# Patient Record
Sex: MALE | Race: WHITE | NOT HISPANIC OR LATINO | URBAN - METROPOLITAN AREA
[De-identification: names, ages, dates, MRNs, and addresses within clinical notes are randomized per-mention and may not be internally consistent; named-entity substitution may affect disease eponyms.]

---

## 2022-01-01 ENCOUNTER — INPATIENT (INPATIENT)
Facility: HOSPITAL | Age: 82
LOS: 9 days | End: 2022-12-25
Attending: INTERNAL MEDICINE | Admitting: INTERNAL MEDICINE
Payer: MEDICARE

## 2022-01-01 VITALS
HEART RATE: 130 BPM | OXYGEN SATURATION: 93 % | DIASTOLIC BLOOD PRESSURE: 60 MMHG | RESPIRATION RATE: 38 BRPM | SYSTOLIC BLOOD PRESSURE: 132 MMHG

## 2022-01-01 VITALS
TEMPERATURE: 97 F | SYSTOLIC BLOOD PRESSURE: 96 MMHG | DIASTOLIC BLOOD PRESSURE: 66 MMHG | HEART RATE: 88 BPM | OXYGEN SATURATION: 94 % | RESPIRATION RATE: 20 BRPM

## 2022-01-01 DIAGNOSIS — F41.9 ANXIETY DISORDER, UNSPECIFIED: ICD-10-CM

## 2022-01-01 DIAGNOSIS — R06.00 DYSPNEA, UNSPECIFIED: ICD-10-CM

## 2022-01-01 DIAGNOSIS — C34.90 MALIGNANT NEOPLASM OF UNSPECIFIED PART OF UNSPECIFIED BRONCHUS OR LUNG: ICD-10-CM

## 2022-01-01 DIAGNOSIS — Z51.5 ENCOUNTER FOR PALLIATIVE CARE: ICD-10-CM

## 2022-01-01 DIAGNOSIS — J96.01 ACUTE RESPIRATORY FAILURE WITH HYPOXIA: ICD-10-CM

## 2022-01-01 LAB
A1C WITH ESTIMATED AVERAGE GLUCOSE RESULT: 5.3 % — SIGNIFICANT CHANGE UP (ref 4–5.6)
ALBUMIN SERPL ELPH-MCNC: 2.4 G/DL — LOW (ref 3.5–5.2)
ALBUMIN SERPL ELPH-MCNC: 2.5 G/DL — LOW (ref 3.5–5.2)
ALBUMIN SERPL ELPH-MCNC: 2.7 G/DL — LOW (ref 3.5–5.2)
ALBUMIN SERPL ELPH-MCNC: 2.8 G/DL — LOW (ref 3.5–5.2)
ALP SERPL-CCNC: 101 U/L — SIGNIFICANT CHANGE UP (ref 30–115)
ALP SERPL-CCNC: 101 U/L — SIGNIFICANT CHANGE UP (ref 30–115)
ALP SERPL-CCNC: 102 U/L — SIGNIFICANT CHANGE UP (ref 30–115)
ALP SERPL-CCNC: 103 U/L — SIGNIFICANT CHANGE UP (ref 30–115)
ALP SERPL-CCNC: 129 U/L — HIGH (ref 30–115)
ALP SERPL-CCNC: 94 U/L — SIGNIFICANT CHANGE UP (ref 30–115)
ALT FLD-CCNC: 10 U/L — SIGNIFICANT CHANGE UP (ref 0–41)
ALT FLD-CCNC: 10 U/L — SIGNIFICANT CHANGE UP (ref 0–41)
ALT FLD-CCNC: 11 U/L — SIGNIFICANT CHANGE UP (ref 0–41)
ALT FLD-CCNC: 12 U/L — SIGNIFICANT CHANGE UP (ref 0–41)
ALT FLD-CCNC: 6 U/L — SIGNIFICANT CHANGE UP (ref 0–41)
ALT FLD-CCNC: 8 U/L — SIGNIFICANT CHANGE UP (ref 0–41)
ANION GAP SERPL CALC-SCNC: 12 MMOL/L — SIGNIFICANT CHANGE UP (ref 7–14)
ANION GAP SERPL CALC-SCNC: 12 MMOL/L — SIGNIFICANT CHANGE UP (ref 7–14)
ANION GAP SERPL CALC-SCNC: 13 MMOL/L — SIGNIFICANT CHANGE UP (ref 7–14)
ANION GAP SERPL CALC-SCNC: 14 MMOL/L — SIGNIFICANT CHANGE UP (ref 7–14)
ANION GAP SERPL CALC-SCNC: 14 MMOL/L — SIGNIFICANT CHANGE UP (ref 7–14)
ANION GAP SERPL CALC-SCNC: 9 MMOL/L — SIGNIFICANT CHANGE UP (ref 7–14)
ANION GAP SERPL CALC-SCNC: 9 MMOL/L — SIGNIFICANT CHANGE UP (ref 7–14)
AST SERPL-CCNC: 11 U/L — SIGNIFICANT CHANGE UP (ref 0–41)
AST SERPL-CCNC: 11 U/L — SIGNIFICANT CHANGE UP (ref 0–41)
AST SERPL-CCNC: 12 U/L — SIGNIFICANT CHANGE UP (ref 0–41)
AST SERPL-CCNC: 20 U/L — SIGNIFICANT CHANGE UP (ref 0–41)
AST SERPL-CCNC: 7 U/L — SIGNIFICANT CHANGE UP (ref 0–41)
AST SERPL-CCNC: 7 U/L — SIGNIFICANT CHANGE UP (ref 0–41)
BASE EXCESS BLDV CALC-SCNC: -1.1 MMOL/L — SIGNIFICANT CHANGE UP (ref -2–3)
BASOPHILS # BLD AUTO: 0.01 K/UL — SIGNIFICANT CHANGE UP (ref 0–0.2)
BASOPHILS # BLD AUTO: 0.02 K/UL — SIGNIFICANT CHANGE UP (ref 0–0.2)
BASOPHILS NFR BLD AUTO: 0.1 % — SIGNIFICANT CHANGE UP (ref 0–1)
BILIRUB SERPL-MCNC: 0.2 MG/DL — SIGNIFICANT CHANGE UP (ref 0.2–1.2)
BILIRUB SERPL-MCNC: 0.3 MG/DL — SIGNIFICANT CHANGE UP (ref 0.2–1.2)
BILIRUB SERPL-MCNC: 0.3 MG/DL — SIGNIFICANT CHANGE UP (ref 0.2–1.2)
BILIRUB SERPL-MCNC: 0.4 MG/DL — SIGNIFICANT CHANGE UP (ref 0.2–1.2)
BILIRUB SERPL-MCNC: 0.5 MG/DL — SIGNIFICANT CHANGE UP (ref 0.2–1.2)
BILIRUB SERPL-MCNC: 0.6 MG/DL — SIGNIFICANT CHANGE UP (ref 0.2–1.2)
BLD GP AB SCN SERPL QL: SIGNIFICANT CHANGE UP
BUN SERPL-MCNC: 21 MG/DL — HIGH (ref 10–20)
BUN SERPL-MCNC: 22 MG/DL — HIGH (ref 10–20)
BUN SERPL-MCNC: 23 MG/DL — HIGH (ref 10–20)
BUN SERPL-MCNC: 23 MG/DL — HIGH (ref 10–20)
BUN SERPL-MCNC: 24 MG/DL — HIGH (ref 10–20)
BUN SERPL-MCNC: 25 MG/DL — HIGH (ref 10–20)
BUN SERPL-MCNC: 27 MG/DL — HIGH (ref 10–20)
BUN SERPL-MCNC: 32 MG/DL — HIGH (ref 10–20)
BUN SERPL-MCNC: 38 MG/DL — HIGH (ref 10–20)
CA-I SERPL-SCNC: 1.27 MMOL/L — SIGNIFICANT CHANGE UP (ref 1.15–1.33)
CALCIUM SERPL-MCNC: 8.4 MG/DL — SIGNIFICANT CHANGE UP (ref 8.4–10.5)
CALCIUM SERPL-MCNC: 8.5 MG/DL — SIGNIFICANT CHANGE UP (ref 8.4–10.5)
CALCIUM SERPL-MCNC: 8.6 MG/DL — SIGNIFICANT CHANGE UP (ref 8.4–10.4)
CALCIUM SERPL-MCNC: 8.7 MG/DL — SIGNIFICANT CHANGE UP (ref 8.4–10.5)
CALCIUM SERPL-MCNC: 8.8 MG/DL — SIGNIFICANT CHANGE UP (ref 8.4–10.4)
CALCIUM SERPL-MCNC: 8.8 MG/DL — SIGNIFICANT CHANGE UP (ref 8.4–10.5)
CALCIUM SERPL-MCNC: 9 MG/DL — SIGNIFICANT CHANGE UP (ref 8.4–10.4)
CHLORIDE SERPL-SCNC: 100 MMOL/L — SIGNIFICANT CHANGE UP (ref 98–110)
CHLORIDE SERPL-SCNC: 100 MMOL/L — SIGNIFICANT CHANGE UP (ref 98–110)
CHLORIDE SERPL-SCNC: 101 MMOL/L — SIGNIFICANT CHANGE UP (ref 98–110)
CHLORIDE SERPL-SCNC: 95 MMOL/L — LOW (ref 98–110)
CHLORIDE SERPL-SCNC: 96 MMOL/L — LOW (ref 98–110)
CHLORIDE SERPL-SCNC: 97 MMOL/L — LOW (ref 98–110)
CHLORIDE SERPL-SCNC: 98 MMOL/L — SIGNIFICANT CHANGE UP (ref 98–110)
CHOLEST SERPL-MCNC: 86 MG/DL — SIGNIFICANT CHANGE UP
CO2 SERPL-SCNC: 24 MMOL/L — SIGNIFICANT CHANGE UP (ref 17–32)
CO2 SERPL-SCNC: 25 MMOL/L — SIGNIFICANT CHANGE UP (ref 17–32)
CO2 SERPL-SCNC: 27 MMOL/L — SIGNIFICANT CHANGE UP (ref 17–32)
CO2 SERPL-SCNC: 27 MMOL/L — SIGNIFICANT CHANGE UP (ref 17–32)
CO2 SERPL-SCNC: 28 MMOL/L — SIGNIFICANT CHANGE UP (ref 17–32)
CO2 SERPL-SCNC: 28 MMOL/L — SIGNIFICANT CHANGE UP (ref 17–32)
CO2 SERPL-SCNC: 29 MMOL/L — SIGNIFICANT CHANGE UP (ref 17–32)
CO2 SERPL-SCNC: 31 MMOL/L — SIGNIFICANT CHANGE UP (ref 17–32)
CO2 SERPL-SCNC: 33 MMOL/L — HIGH (ref 17–32)
CREAT SERPL-MCNC: 0.6 MG/DL — LOW (ref 0.7–1.5)
CREAT SERPL-MCNC: 0.7 MG/DL — SIGNIFICANT CHANGE UP (ref 0.7–1.5)
CREAT SERPL-MCNC: 0.8 MG/DL — SIGNIFICANT CHANGE UP (ref 0.7–1.5)
CREAT SERPL-MCNC: 0.8 MG/DL — SIGNIFICANT CHANGE UP (ref 0.7–1.5)
CULTURE RESULTS: SIGNIFICANT CHANGE UP
D DIMER BLD IA.RAPID-MCNC: 397 NG/ML DDU — HIGH
EGFR: 88 ML/MIN/1.73M2 — SIGNIFICANT CHANGE UP
EGFR: 88 ML/MIN/1.73M2 — SIGNIFICANT CHANGE UP
EGFR: 91 ML/MIN/1.73M2 — SIGNIFICANT CHANGE UP
EGFR: 96 ML/MIN/1.73M2 — SIGNIFICANT CHANGE UP
EOSINOPHIL # BLD AUTO: 0 K/UL — SIGNIFICANT CHANGE UP (ref 0–0.7)
EOSINOPHIL # BLD AUTO: 0.02 K/UL — SIGNIFICANT CHANGE UP (ref 0–0.7)
EOSINOPHIL # BLD AUTO: 0.03 K/UL — SIGNIFICANT CHANGE UP (ref 0–0.7)
EOSINOPHIL NFR BLD AUTO: 0 % — SIGNIFICANT CHANGE UP (ref 0–8)
EOSINOPHIL NFR BLD AUTO: 0.1 % — SIGNIFICANT CHANGE UP (ref 0–8)
EOSINOPHIL NFR BLD AUTO: 0.1 % — SIGNIFICANT CHANGE UP (ref 0–8)
ESTIMATED AVERAGE GLUCOSE: 105 MG/DL — SIGNIFICANT CHANGE UP (ref 68–114)
FLUAV AG NPH QL: SIGNIFICANT CHANGE UP
FLUBV AG NPH QL: SIGNIFICANT CHANGE UP
GAS PNL BLDV: 132 MMOL/L — LOW (ref 136–145)
GAS PNL BLDV: SIGNIFICANT CHANGE UP
GLUCOSE SERPL-MCNC: 106 MG/DL — HIGH (ref 70–99)
GLUCOSE SERPL-MCNC: 108 MG/DL — HIGH (ref 70–99)
GLUCOSE SERPL-MCNC: 110 MG/DL — HIGH (ref 70–99)
GLUCOSE SERPL-MCNC: 118 MG/DL — HIGH (ref 70–99)
GLUCOSE SERPL-MCNC: 124 MG/DL — HIGH (ref 70–99)
GLUCOSE SERPL-MCNC: 124 MG/DL — HIGH (ref 70–99)
GLUCOSE SERPL-MCNC: 127 MG/DL — HIGH (ref 70–99)
GLUCOSE SERPL-MCNC: 134 MG/DL — HIGH (ref 70–99)
GLUCOSE SERPL-MCNC: 143 MG/DL — HIGH (ref 70–99)
HCO3 BLDV-SCNC: 25 MMOL/L — SIGNIFICANT CHANGE UP (ref 22–29)
HCT VFR BLD CALC: 23.3 % — LOW (ref 42–52)
HCT VFR BLD CALC: 25 % — LOW (ref 42–52)
HCT VFR BLD CALC: 26.8 % — LOW (ref 42–52)
HCT VFR BLD CALC: 28.7 % — LOW (ref 42–52)
HCT VFR BLD CALC: 29.1 % — LOW (ref 42–52)
HCT VFR BLD CALC: 29.1 % — LOW (ref 42–52)
HCT VFR BLD CALC: 30.4 % — LOW (ref 42–52)
HCT VFR BLDA CALC: 28 % — LOW (ref 39–51)
HDLC SERPL-MCNC: 41 MG/DL — SIGNIFICANT CHANGE UP
HGB BLD CALC-MCNC: 9.2 G/DL — LOW (ref 12.6–17.4)
HGB BLD-MCNC: 7.4 G/DL — LOW (ref 14–18)
HGB BLD-MCNC: 7.5 G/DL — LOW (ref 14–18)
HGB BLD-MCNC: 8.3 G/DL — LOW (ref 14–18)
HGB BLD-MCNC: 8.8 G/DL — LOW (ref 14–18)
HGB BLD-MCNC: 9.1 G/DL — LOW (ref 14–18)
HGB BLD-MCNC: 9.5 G/DL — LOW (ref 14–18)
HGB BLD-MCNC: 9.6 G/DL — LOW (ref 14–18)
IMM GRANULOCYTES NFR BLD AUTO: 0.5 % — HIGH (ref 0.1–0.3)
IMM GRANULOCYTES NFR BLD AUTO: 0.6 % — HIGH (ref 0.1–0.3)
IMM GRANULOCYTES NFR BLD AUTO: 0.6 % — HIGH (ref 0.1–0.3)
IMM GRANULOCYTES NFR BLD AUTO: 0.8 % — HIGH (ref 0.1–0.3)
IMM GRANULOCYTES NFR BLD AUTO: 0.9 % — HIGH (ref 0.1–0.3)
IMM GRANULOCYTES NFR BLD AUTO: 1 % — HIGH (ref 0.1–0.3)
LACTATE BLDV-MCNC: 4.9 MMOL/L — CRITICAL HIGH (ref 0.5–2)
LACTATE SERPL-SCNC: 1.3 MMOL/L — SIGNIFICANT CHANGE UP (ref 0.7–2)
LACTATE SERPL-SCNC: 1.8 MMOL/L — SIGNIFICANT CHANGE UP (ref 0.7–2)
LIPID PNL WITH DIRECT LDL SERPL: 36 MG/DL — SIGNIFICANT CHANGE UP
LYMPHOCYTES # BLD AUTO: 0.6 K/UL — LOW (ref 1.2–3.4)
LYMPHOCYTES # BLD AUTO: 0.65 K/UL — LOW (ref 1.2–3.4)
LYMPHOCYTES # BLD AUTO: 0.67 K/UL — LOW (ref 1.2–3.4)
LYMPHOCYTES # BLD AUTO: 0.95 K/UL — LOW (ref 1.2–3.4)
LYMPHOCYTES # BLD AUTO: 1.07 K/UL — LOW (ref 1.2–3.4)
LYMPHOCYTES # BLD AUTO: 1.49 K/UL — SIGNIFICANT CHANGE UP (ref 1.2–3.4)
LYMPHOCYTES # BLD AUTO: 3.7 % — LOW (ref 20.5–51.1)
LYMPHOCYTES # BLD AUTO: 4.9 % — LOW (ref 20.5–51.1)
LYMPHOCYTES # BLD AUTO: 5.2 % — LOW (ref 20.5–51.1)
LYMPHOCYTES # BLD AUTO: 5.3 % — LOW (ref 20.5–51.1)
LYMPHOCYTES # BLD AUTO: 5.9 % — LOW (ref 20.5–51.1)
LYMPHOCYTES # BLD AUTO: 6.3 % — LOW (ref 20.5–51.1)
MAGNESIUM SERPL-MCNC: 1.6 MG/DL — LOW (ref 1.8–2.4)
MAGNESIUM SERPL-MCNC: 1.7 MG/DL — LOW (ref 1.8–2.4)
MAGNESIUM SERPL-MCNC: 2 MG/DL — SIGNIFICANT CHANGE UP (ref 1.8–2.4)
MAGNESIUM SERPL-MCNC: 2.1 MG/DL — SIGNIFICANT CHANGE UP (ref 1.8–2.4)
MAGNESIUM SERPL-MCNC: 2.1 MG/DL — SIGNIFICANT CHANGE UP (ref 1.8–2.4)
MAGNESIUM SERPL-MCNC: 2.2 MG/DL — SIGNIFICANT CHANGE UP (ref 1.8–2.4)
MCHC RBC-ENTMCNC: 24.2 PG — LOW (ref 27–31)
MCHC RBC-ENTMCNC: 24.5 PG — LOW (ref 27–31)
MCHC RBC-ENTMCNC: 24.7 PG — LOW (ref 27–31)
MCHC RBC-ENTMCNC: 24.9 PG — LOW (ref 27–31)
MCHC RBC-ENTMCNC: 25.1 PG — LOW (ref 27–31)
MCHC RBC-ENTMCNC: 25.1 PG — LOW (ref 27–31)
MCHC RBC-ENTMCNC: 25.3 PG — LOW (ref 27–31)
MCHC RBC-ENTMCNC: 30 G/DL — LOW (ref 32–37)
MCHC RBC-ENTMCNC: 30.7 G/DL — LOW (ref 32–37)
MCHC RBC-ENTMCNC: 31 G/DL — LOW (ref 32–37)
MCHC RBC-ENTMCNC: 31.3 G/DL — LOW (ref 32–37)
MCHC RBC-ENTMCNC: 31.6 G/DL — LOW (ref 32–37)
MCHC RBC-ENTMCNC: 31.8 G/DL — LOW (ref 32–37)
MCHC RBC-ENTMCNC: 32.6 G/DL — SIGNIFICANT CHANGE UP (ref 32–37)
MCV RBC AUTO: 77.6 FL — LOW (ref 80–94)
MCV RBC AUTO: 78.4 FL — LOW (ref 80–94)
MCV RBC AUTO: 79 FL — LOW (ref 80–94)
MCV RBC AUTO: 79 FL — LOW (ref 80–94)
MCV RBC AUTO: 79.8 FL — LOW (ref 80–94)
MCV RBC AUTO: 80.6 FL — SIGNIFICANT CHANGE UP (ref 80–94)
MCV RBC AUTO: 81.8 FL — SIGNIFICANT CHANGE UP (ref 80–94)
MONOCYTES # BLD AUTO: 0.23 K/UL — SIGNIFICANT CHANGE UP (ref 0.1–0.6)
MONOCYTES # BLD AUTO: 0.29 K/UL — SIGNIFICANT CHANGE UP (ref 0.1–0.6)
MONOCYTES # BLD AUTO: 0.5 K/UL — SIGNIFICANT CHANGE UP (ref 0.1–0.6)
MONOCYTES # BLD AUTO: 0.76 K/UL — HIGH (ref 0.1–0.6)
MONOCYTES # BLD AUTO: 0.83 K/UL — HIGH (ref 0.1–0.6)
MONOCYTES # BLD AUTO: 0.94 K/UL — HIGH (ref 0.1–0.6)
MONOCYTES NFR BLD AUTO: 1.4 % — LOW (ref 1.7–9.3)
MONOCYTES NFR BLD AUTO: 2.6 % — SIGNIFICANT CHANGE UP (ref 1.7–9.3)
MONOCYTES NFR BLD AUTO: 3.6 % — SIGNIFICANT CHANGE UP (ref 1.7–9.3)
MONOCYTES NFR BLD AUTO: 3.7 % — SIGNIFICANT CHANGE UP (ref 1.7–9.3)
MONOCYTES NFR BLD AUTO: 3.9 % — SIGNIFICANT CHANGE UP (ref 1.7–9.3)
MONOCYTES NFR BLD AUTO: 4.6 % — SIGNIFICANT CHANGE UP (ref 1.7–9.3)
MRSA PCR RESULT.: NEGATIVE — SIGNIFICANT CHANGE UP
NEUTROPHILS # BLD AUTO: 12.48 K/UL — HIGH (ref 1.4–6.5)
NEUTROPHILS # BLD AUTO: 15.1 K/UL — HIGH (ref 1.4–6.5)
NEUTROPHILS # BLD AUTO: 16.23 K/UL — HIGH (ref 1.4–6.5)
NEUTROPHILS # BLD AUTO: 18.23 K/UL — HIGH (ref 1.4–6.5)
NEUTROPHILS # BLD AUTO: 21.12 K/UL — HIGH (ref 1.4–6.5)
NEUTROPHILS # BLD AUTO: 9.95 K/UL — HIGH (ref 1.4–6.5)
NEUTROPHILS NFR BLD AUTO: 88.7 % — HIGH (ref 42.2–75.2)
NEUTROPHILS NFR BLD AUTO: 89.3 % — HIGH (ref 42.2–75.2)
NEUTROPHILS NFR BLD AUTO: 89.8 % — HIGH (ref 42.2–75.2)
NEUTROPHILS NFR BLD AUTO: 90.8 % — HIGH (ref 42.2–75.2)
NEUTROPHILS NFR BLD AUTO: 90.9 % — HIGH (ref 42.2–75.2)
NEUTROPHILS NFR BLD AUTO: 94.2 % — HIGH (ref 42.2–75.2)
NON HDL CHOLESTEROL: 45 MG/DL — SIGNIFICANT CHANGE UP
NRBC # BLD: 0 /100 WBCS — SIGNIFICANT CHANGE UP (ref 0–0)
NT-PROBNP SERPL-SCNC: 3665 PG/ML — HIGH (ref 0–300)
PCO2 BLDV: 46 MMHG — SIGNIFICANT CHANGE UP (ref 42–55)
PH BLDV: 7.34 — SIGNIFICANT CHANGE UP (ref 7.32–7.43)
PHOSPHATE SERPL-MCNC: 4.6 MG/DL — SIGNIFICANT CHANGE UP (ref 2.1–4.9)
PLATELET # BLD AUTO: 281 K/UL — SIGNIFICANT CHANGE UP (ref 130–400)
PLATELET # BLD AUTO: 303 K/UL — SIGNIFICANT CHANGE UP (ref 130–400)
PLATELET # BLD AUTO: 308 K/UL — SIGNIFICANT CHANGE UP (ref 130–400)
PLATELET # BLD AUTO: 309 K/UL — SIGNIFICANT CHANGE UP (ref 130–400)
PLATELET # BLD AUTO: 349 K/UL — SIGNIFICANT CHANGE UP (ref 130–400)
PLATELET # BLD AUTO: 360 K/UL — SIGNIFICANT CHANGE UP (ref 130–400)
PLATELET # BLD AUTO: 406 K/UL — HIGH (ref 130–400)
PO2 BLDV: 24 MMHG — SIGNIFICANT CHANGE UP
POTASSIUM BLDV-SCNC: 4.6 MMOL/L — SIGNIFICANT CHANGE UP (ref 3.5–5.1)
POTASSIUM SERPL-MCNC: 2.8 MMOL/L — LOW (ref 3.5–5)
POTASSIUM SERPL-MCNC: 3.2 MMOL/L — LOW (ref 3.5–5)
POTASSIUM SERPL-MCNC: 3.2 MMOL/L — LOW (ref 3.5–5)
POTASSIUM SERPL-MCNC: 3.5 MMOL/L — SIGNIFICANT CHANGE UP (ref 3.5–5)
POTASSIUM SERPL-MCNC: 3.7 MMOL/L — SIGNIFICANT CHANGE UP (ref 3.5–5)
POTASSIUM SERPL-MCNC: 3.9 MMOL/L — SIGNIFICANT CHANGE UP (ref 3.5–5)
POTASSIUM SERPL-MCNC: 4 MMOL/L — SIGNIFICANT CHANGE UP (ref 3.5–5)
POTASSIUM SERPL-MCNC: 4.2 MMOL/L — SIGNIFICANT CHANGE UP (ref 3.5–5)
POTASSIUM SERPL-MCNC: 4.9 MMOL/L — SIGNIFICANT CHANGE UP (ref 3.5–5)
POTASSIUM SERPL-SCNC: 2.8 MMOL/L — LOW (ref 3.5–5)
POTASSIUM SERPL-SCNC: 3.2 MMOL/L — LOW (ref 3.5–5)
POTASSIUM SERPL-SCNC: 3.2 MMOL/L — LOW (ref 3.5–5)
POTASSIUM SERPL-SCNC: 3.5 MMOL/L — SIGNIFICANT CHANGE UP (ref 3.5–5)
POTASSIUM SERPL-SCNC: 3.7 MMOL/L — SIGNIFICANT CHANGE UP (ref 3.5–5)
POTASSIUM SERPL-SCNC: 3.9 MMOL/L — SIGNIFICANT CHANGE UP (ref 3.5–5)
POTASSIUM SERPL-SCNC: 4 MMOL/L — SIGNIFICANT CHANGE UP (ref 3.5–5)
POTASSIUM SERPL-SCNC: 4.2 MMOL/L — SIGNIFICANT CHANGE UP (ref 3.5–5)
POTASSIUM SERPL-SCNC: 4.9 MMOL/L — SIGNIFICANT CHANGE UP (ref 3.5–5)
PROCALCITONIN SERPL-MCNC: 0.31 NG/ML — HIGH (ref 0.02–0.1)
PROT SERPL-MCNC: 4.9 G/DL — LOW (ref 6–8)
PROT SERPL-MCNC: 5.2 G/DL — LOW (ref 6–8)
PROT SERPL-MCNC: 5.3 G/DL — LOW (ref 6–8)
PROT SERPL-MCNC: 5.6 G/DL — LOW (ref 6–8)
RBC # BLD: 2.95 M/UL — LOW (ref 4.7–6.1)
RBC # BLD: 3.1 M/UL — LOW (ref 4.7–6.1)
RBC # BLD: 3.36 M/UL — LOW (ref 4.7–6.1)
RBC # BLD: 3.51 M/UL — LOW (ref 4.7–6.1)
RBC # BLD: 3.71 M/UL — LOW (ref 4.7–6.1)
RBC # BLD: 3.75 M/UL — LOW (ref 4.7–6.1)
RBC # BLD: 3.85 M/UL — LOW (ref 4.7–6.1)
RBC # FLD: 17.1 % — HIGH (ref 11.5–14.5)
RBC # FLD: 17.2 % — HIGH (ref 11.5–14.5)
RBC # FLD: 17.3 % — HIGH (ref 11.5–14.5)
RSV RNA NPH QL NAA+NON-PROBE: SIGNIFICANT CHANGE UP
SAO2 % BLDV: 26.2 % — SIGNIFICANT CHANGE UP
SARS-COV-2 RNA SPEC QL NAA+PROBE: DETECTED
SARS-COV-2 RNA SPEC QL NAA+PROBE: SIGNIFICANT CHANGE UP
SODIUM SERPL-SCNC: 132 MMOL/L — LOW (ref 135–146)
SODIUM SERPL-SCNC: 133 MMOL/L — LOW (ref 135–146)
SODIUM SERPL-SCNC: 135 MMOL/L — SIGNIFICANT CHANGE UP (ref 135–146)
SODIUM SERPL-SCNC: 136 MMOL/L — SIGNIFICANT CHANGE UP (ref 135–146)
SODIUM SERPL-SCNC: 137 MMOL/L — SIGNIFICANT CHANGE UP (ref 135–146)
SODIUM SERPL-SCNC: 138 MMOL/L — SIGNIFICANT CHANGE UP (ref 135–146)
SODIUM SERPL-SCNC: 141 MMOL/L — SIGNIFICANT CHANGE UP (ref 135–146)
SODIUM SERPL-SCNC: 142 MMOL/L — SIGNIFICANT CHANGE UP (ref 135–146)
SODIUM SERPL-SCNC: 144 MMOL/L — SIGNIFICANT CHANGE UP (ref 135–146)
SPECIMEN SOURCE: SIGNIFICANT CHANGE UP
TRIGL SERPL-MCNC: 46 MG/DL — SIGNIFICANT CHANGE UP
TROPONIN T SERPL-MCNC: 0.02 NG/ML — HIGH
TROPONIN T SERPL-MCNC: <0.01 NG/ML — SIGNIFICANT CHANGE UP
WBC # BLD: 10.96 K/UL — HIGH (ref 4.8–10.8)
WBC # BLD: 13.74 K/UL — HIGH (ref 4.8–10.8)
WBC # BLD: 16.03 K/UL — HIGH (ref 4.8–10.8)
WBC # BLD: 18.18 K/UL — HIGH (ref 4.8–10.8)
WBC # BLD: 20.31 K/UL — HIGH (ref 4.8–10.8)
WBC # BLD: 23.73 K/UL — HIGH (ref 4.8–10.8)
WBC # BLD: 23.81 K/UL — HIGH (ref 4.8–10.8)
WBC # FLD AUTO: 10.96 K/UL — HIGH (ref 4.8–10.8)
WBC # FLD AUTO: 13.74 K/UL — HIGH (ref 4.8–10.8)
WBC # FLD AUTO: 16.03 K/UL — HIGH (ref 4.8–10.8)
WBC # FLD AUTO: 18.18 K/UL — HIGH (ref 4.8–10.8)
WBC # FLD AUTO: 20.31 K/UL — HIGH (ref 4.8–10.8)
WBC # FLD AUTO: 23.73 K/UL — HIGH (ref 4.8–10.8)
WBC # FLD AUTO: 23.81 K/UL — HIGH (ref 4.8–10.8)

## 2022-01-01 PROCEDURE — 99233 SBSQ HOSP IP/OBS HIGH 50: CPT

## 2022-01-01 PROCEDURE — 99285 EMERGENCY DEPT VISIT HI MDM: CPT | Mod: 25

## 2022-01-01 PROCEDURE — 99232 SBSQ HOSP IP/OBS MODERATE 35: CPT

## 2022-01-01 PROCEDURE — 71045 X-RAY EXAM CHEST 1 VIEW: CPT | Mod: 26

## 2022-01-01 PROCEDURE — 99222 1ST HOSP IP/OBS MODERATE 55: CPT

## 2022-01-01 PROCEDURE — 71250 CT THORAX DX C-: CPT | Mod: 26

## 2022-01-01 PROCEDURE — 99231 SBSQ HOSP IP/OBS SF/LOW 25: CPT

## 2022-01-01 PROCEDURE — 93010 ELECTROCARDIOGRAM REPORT: CPT

## 2022-01-01 PROCEDURE — 99221 1ST HOSP IP/OBS SF/LOW 40: CPT | Mod: GC

## 2022-01-01 PROCEDURE — 99223 1ST HOSP IP/OBS HIGH 75: CPT

## 2022-01-01 PROCEDURE — 99233 SBSQ HOSP IP/OBS HIGH 50: CPT | Mod: 25

## 2022-01-01 PROCEDURE — 99497 ADVNCD CARE PLAN 30 MIN: CPT | Mod: 25

## 2022-01-01 PROCEDURE — 93970 EXTREMITY STUDY: CPT | Mod: 26

## 2022-01-01 PROCEDURE — 99497 ADVNCD CARE PLAN 30 MIN: CPT

## 2022-01-01 PROCEDURE — 93306 TTE W/DOPPLER COMPLETE: CPT | Mod: 26

## 2022-01-01 PROCEDURE — 76604 US EXAM CHEST: CPT | Mod: 26

## 2022-01-01 RX ORDER — FUROSEMIDE 40 MG
1 TABLET ORAL
Qty: 0 | Refills: 0 | DISCHARGE

## 2022-01-01 RX ORDER — NITROGLYCERIN 6.5 MG
1 CAPSULE, EXTENDED RELEASE ORAL ONCE
Refills: 0 | Status: COMPLETED | OUTPATIENT
Start: 2022-01-01 | End: 2022-01-01

## 2022-01-01 RX ORDER — CALCIUM CARBONATE 500(1250)
1 TABLET ORAL
Qty: 0 | Refills: 0 | DISCHARGE

## 2022-01-01 RX ORDER — POTASSIUM CHLORIDE 20 MEQ
40 PACKET (EA) ORAL
Refills: 0 | Status: COMPLETED | OUTPATIENT
Start: 2022-01-01 | End: 2022-01-01

## 2022-01-01 RX ORDER — METOPROLOL TARTRATE 50 MG
5 TABLET ORAL ONCE
Refills: 0 | Status: COMPLETED | OUTPATIENT
Start: 2022-01-01 | End: 2022-01-01

## 2022-01-01 RX ORDER — FUROSEMIDE 40 MG
40 TABLET ORAL EVERY 12 HOURS
Refills: 0 | Status: DISCONTINUED | OUTPATIENT
Start: 2022-01-01 | End: 2022-01-01

## 2022-01-01 RX ORDER — CHLORHEXIDINE GLUCONATE 213 G/1000ML
1 SOLUTION TOPICAL
Refills: 0 | Status: DISCONTINUED | OUTPATIENT
Start: 2022-01-01 | End: 2022-01-01

## 2022-01-01 RX ORDER — FAMOTIDINE 10 MG/ML
1 INJECTION INTRAVENOUS
Qty: 0 | Refills: 0 | DISCHARGE

## 2022-01-01 RX ORDER — CALCIUM CARBONATE 500(1250)
1 TABLET ORAL DAILY
Refills: 0 | Status: DISCONTINUED | OUTPATIENT
Start: 2022-01-01 | End: 2022-01-01

## 2022-01-01 RX ORDER — AMLODIPINE BESYLATE 2.5 MG/1
1 TABLET ORAL
Qty: 0 | Refills: 0 | DISCHARGE

## 2022-01-01 RX ORDER — PIPERACILLIN AND TAZOBACTAM 4; .5 G/20ML; G/20ML
3.38 INJECTION, POWDER, LYOPHILIZED, FOR SOLUTION INTRAVENOUS ONCE
Refills: 0 | Status: COMPLETED | OUTPATIENT
Start: 2022-01-01 | End: 2022-01-01

## 2022-01-01 RX ORDER — POTASSIUM CHLORIDE 20 MEQ
1 PACKET (EA) ORAL
Qty: 0 | Refills: 0 | DISCHARGE

## 2022-01-01 RX ORDER — MAGNESIUM SULFATE 500 MG/ML
2 VIAL (ML) INJECTION ONCE
Refills: 0 | Status: DISCONTINUED | OUTPATIENT
Start: 2022-01-01 | End: 2022-01-01

## 2022-01-01 RX ORDER — FUROSEMIDE 40 MG
20 TABLET ORAL ONCE
Refills: 0 | Status: COMPLETED | OUTPATIENT
Start: 2022-01-01 | End: 2022-01-01

## 2022-01-01 RX ORDER — POTASSIUM CHLORIDE 20 MEQ
20 PACKET (EA) ORAL
Refills: 0 | Status: COMPLETED | OUTPATIENT
Start: 2022-01-01 | End: 2022-01-01

## 2022-01-01 RX ORDER — LATANOPROST 0.05 MG/ML
1 SOLUTION/ DROPS OPHTHALMIC; TOPICAL AT BEDTIME
Refills: 0 | Status: DISCONTINUED | OUTPATIENT
Start: 2022-01-01 | End: 2022-01-01

## 2022-01-01 RX ORDER — AMLODIPINE BESYLATE 2.5 MG/1
10 TABLET ORAL DAILY
Refills: 0 | Status: DISCONTINUED | OUTPATIENT
Start: 2022-01-01 | End: 2022-01-01

## 2022-01-01 RX ORDER — PIPERACILLIN AND TAZOBACTAM 4; .5 G/20ML; G/20ML
3.38 INJECTION, POWDER, LYOPHILIZED, FOR SOLUTION INTRAVENOUS EVERY 8 HOURS
Refills: 0 | Status: DISCONTINUED | OUTPATIENT
Start: 2022-01-01 | End: 2022-01-01

## 2022-01-01 RX ORDER — MAGNESIUM SULFATE 500 MG/ML
2 VIAL (ML) INJECTION
Refills: 0 | Status: COMPLETED | OUTPATIENT
Start: 2022-01-01 | End: 2022-01-01

## 2022-01-01 RX ORDER — VANCOMYCIN HCL 1 G
1000 VIAL (EA) INTRAVENOUS EVERY 12 HOURS
Refills: 0 | Status: DISCONTINUED | OUTPATIENT
Start: 2022-01-01 | End: 2022-01-01

## 2022-01-01 RX ORDER — HYDROMORPHONE HYDROCHLORIDE 2 MG/ML
0.5 INJECTION INTRAMUSCULAR; INTRAVENOUS; SUBCUTANEOUS EVERY 8 HOURS
Refills: 0 | Status: DISCONTINUED | OUTPATIENT
Start: 2022-01-01 | End: 2022-01-01

## 2022-01-01 RX ORDER — AZITHROMYCIN 500 MG/1
500 TABLET, FILM COATED ORAL EVERY 24 HOURS
Refills: 0 | Status: DISCONTINUED | OUTPATIENT
Start: 2022-01-01 | End: 2022-01-01

## 2022-01-01 RX ORDER — METOPROLOL TARTRATE 50 MG
25 TABLET ORAL EVERY 6 HOURS
Refills: 0 | Status: DISCONTINUED | OUTPATIENT
Start: 2022-01-01 | End: 2022-01-01

## 2022-01-01 RX ORDER — ALBUTEROL 90 UG/1
3 AEROSOL, METERED ORAL
Qty: 0 | Refills: 0 | DISCHARGE

## 2022-01-01 RX ORDER — METOPROLOL TARTRATE 50 MG
2.5 TABLET ORAL ONCE
Refills: 0 | Status: COMPLETED | OUTPATIENT
Start: 2022-01-01 | End: 2022-01-01

## 2022-01-01 RX ORDER — MORPHINE SULFATE 50 MG/1
2 CAPSULE, EXTENDED RELEASE ORAL
Refills: 0 | Status: DISCONTINUED | OUTPATIENT
Start: 2022-01-01 | End: 2022-01-01

## 2022-01-01 RX ORDER — CEFEPIME 1 G/1
INJECTION, POWDER, FOR SOLUTION INTRAMUSCULAR; INTRAVENOUS
Refills: 0 | Status: DISCONTINUED | OUTPATIENT
Start: 2022-01-01 | End: 2022-01-01

## 2022-01-01 RX ORDER — MAGNESIUM SULFATE 500 MG/ML
2 VIAL (ML) INJECTION ONCE
Refills: 0 | Status: COMPLETED | OUTPATIENT
Start: 2022-01-01 | End: 2022-01-01

## 2022-01-01 RX ORDER — VANCOMYCIN HCL 1 G
1000 VIAL (EA) INTRAVENOUS ONCE
Refills: 0 | Status: COMPLETED | OUTPATIENT
Start: 2022-01-01 | End: 2022-01-01

## 2022-01-01 RX ORDER — ENOXAPARIN SODIUM 100 MG/ML
60 INJECTION SUBCUTANEOUS EVERY 12 HOURS
Refills: 0 | Status: DISCONTINUED | OUTPATIENT
Start: 2022-01-01 | End: 2022-01-01

## 2022-01-01 RX ORDER — ONDANSETRON 8 MG/1
1 TABLET, FILM COATED ORAL
Qty: 0 | Refills: 0 | DISCHARGE

## 2022-01-01 RX ORDER — ROBINUL 0.2 MG/ML
0.2 INJECTION INTRAMUSCULAR; INTRAVENOUS EVERY 6 HOURS
Refills: 0 | Status: DISCONTINUED | OUTPATIENT
Start: 2022-01-01 | End: 2022-01-01

## 2022-01-01 RX ORDER — POTASSIUM CHLORIDE 20 MEQ
40 PACKET (EA) ORAL EVERY 4 HOURS
Refills: 0 | Status: DISCONTINUED | OUTPATIENT
Start: 2022-01-01 | End: 2022-01-01

## 2022-01-01 RX ORDER — FUROSEMIDE 40 MG
40 TABLET ORAL ONCE
Refills: 0 | Status: COMPLETED | OUTPATIENT
Start: 2022-01-01 | End: 2022-01-01

## 2022-01-01 RX ORDER — METOPROLOL TARTRATE 50 MG
1 TABLET ORAL
Qty: 0 | Refills: 0 | DISCHARGE

## 2022-01-01 RX ORDER — METOPROLOL TARTRATE 50 MG
5 TABLET ORAL EVERY 6 HOURS
Refills: 0 | Status: DISCONTINUED | OUTPATIENT
Start: 2022-01-01 | End: 2022-01-01

## 2022-01-01 RX ORDER — POTASSIUM CHLORIDE 20 MEQ
20 PACKET (EA) ORAL ONCE
Refills: 0 | Status: COMPLETED | OUTPATIENT
Start: 2022-01-01 | End: 2022-01-01

## 2022-01-01 RX ORDER — POTASSIUM CHLORIDE 20 MEQ
20 PACKET (EA) ORAL
Refills: 0 | Status: DISCONTINUED | OUTPATIENT
Start: 2022-01-01 | End: 2022-01-01

## 2022-01-01 RX ORDER — FUROSEMIDE 40 MG
40 TABLET ORAL DAILY
Refills: 0 | Status: DISCONTINUED | OUTPATIENT
Start: 2022-01-01 | End: 2022-01-01

## 2022-01-01 RX ORDER — CEFEPIME 1 G/1
2000 INJECTION, POWDER, FOR SOLUTION INTRAMUSCULAR; INTRAVENOUS ONCE
Refills: 0 | Status: COMPLETED | OUTPATIENT
Start: 2022-01-01 | End: 2022-01-01

## 2022-01-01 RX ORDER — FAMOTIDINE 10 MG/ML
20 INJECTION INTRAVENOUS DAILY
Refills: 0 | Status: DISCONTINUED | OUTPATIENT
Start: 2022-01-01 | End: 2022-01-01

## 2022-01-01 RX ORDER — POLYETHYLENE GLYCOL 3350 17 G/17G
17 POWDER, FOR SOLUTION ORAL DAILY
Refills: 0 | Status: DISCONTINUED | OUTPATIENT
Start: 2022-01-01 | End: 2022-01-01

## 2022-01-01 RX ORDER — NITROGLYCERIN 6.5 MG
0.4 CAPSULE, EXTENDED RELEASE ORAL ONCE
Refills: 0 | Status: DISCONTINUED | OUTPATIENT
Start: 2022-01-01 | End: 2022-01-01

## 2022-01-01 RX ORDER — FUROSEMIDE 40 MG
20 TABLET ORAL DAILY
Refills: 0 | Status: DISCONTINUED | OUTPATIENT
Start: 2022-01-01 | End: 2022-01-01

## 2022-01-01 RX ORDER — APIXABAN 2.5 MG/1
5 TABLET, FILM COATED ORAL EVERY 12 HOURS
Refills: 0 | Status: DISCONTINUED | OUTPATIENT
Start: 2022-01-01 | End: 2022-01-01

## 2022-01-01 RX ORDER — POLYETHYLENE GLYCOL 3350 17 G/17G
1 POWDER, FOR SOLUTION ORAL
Qty: 0 | Refills: 0 | DISCHARGE

## 2022-01-01 RX ORDER — IPRATROPIUM/ALBUTEROL SULFATE 18-103MCG
3 AEROSOL WITH ADAPTER (GRAM) INHALATION EVERY 6 HOURS
Refills: 0 | Status: DISCONTINUED | OUTPATIENT
Start: 2022-01-01 | End: 2022-01-01

## 2022-01-01 RX ORDER — ALBUTEROL 90 UG/1
2 AEROSOL, METERED ORAL EVERY 6 HOURS
Refills: 0 | Status: DISCONTINUED | OUTPATIENT
Start: 2022-01-01 | End: 2022-01-01

## 2022-01-01 RX ORDER — APIXABAN 2.5 MG/1
1 TABLET, FILM COATED ORAL
Qty: 0 | Refills: 0 | DISCHARGE

## 2022-01-01 RX ORDER — CEFTRIAXONE 500 MG/1
1000 INJECTION, POWDER, FOR SOLUTION INTRAMUSCULAR; INTRAVENOUS EVERY 24 HOURS
Refills: 0 | Status: DISCONTINUED | OUTPATIENT
Start: 2022-01-01 | End: 2022-01-01

## 2022-01-01 RX ORDER — MORPHINE SULFATE 50 MG/1
0.5 CAPSULE, EXTENDED RELEASE ORAL
Refills: 0 | Status: DISCONTINUED | OUTPATIENT
Start: 2022-01-01 | End: 2022-01-01

## 2022-01-01 RX ORDER — LATANOPROST 0.05 MG/ML
1 SOLUTION/ DROPS OPHTHALMIC; TOPICAL
Qty: 0 | Refills: 0 | DISCHARGE

## 2022-01-01 RX ORDER — METRONIDAZOLE 500 MG
500 TABLET ORAL EVERY 8 HOURS
Refills: 0 | Status: DISCONTINUED | OUTPATIENT
Start: 2022-01-01 | End: 2022-01-01

## 2022-01-01 RX ORDER — CEFEPIME 1 G/1
2000 INJECTION, POWDER, FOR SOLUTION INTRAMUSCULAR; INTRAVENOUS EVERY 8 HOURS
Refills: 0 | Status: DISCONTINUED | OUTPATIENT
Start: 2022-01-01 | End: 2022-01-01

## 2022-01-01 RX ADMIN — ENOXAPARIN SODIUM 60 MILLIGRAM(S): 100 INJECTION SUBCUTANEOUS at 17:30

## 2022-01-01 RX ADMIN — Medication 50 MILLIEQUIVALENT(S): at 15:04

## 2022-01-01 RX ADMIN — Medication 40 MILLIGRAM(S): at 17:02

## 2022-01-01 RX ADMIN — MORPHINE SULFATE 2 MILLIGRAM(S): 50 CAPSULE, EXTENDED RELEASE ORAL at 14:15

## 2022-01-01 RX ADMIN — PIPERACILLIN AND TAZOBACTAM 25 GRAM(S): 4; .5 INJECTION, POWDER, LYOPHILIZED, FOR SOLUTION INTRAVENOUS at 21:07

## 2022-01-01 RX ADMIN — MORPHINE SULFATE 2 MILLIGRAM(S): 50 CAPSULE, EXTENDED RELEASE ORAL at 18:23

## 2022-01-01 RX ADMIN — Medication 20 MILLIGRAM(S): at 06:02

## 2022-01-01 RX ADMIN — Medication 250 MILLIGRAM(S): at 13:31

## 2022-01-01 RX ADMIN — Medication 50 MILLIEQUIVALENT(S): at 10:16

## 2022-01-01 RX ADMIN — LATANOPROST 1 DROP(S): 0.05 SOLUTION/ DROPS OPHTHALMIC; TOPICAL at 01:45

## 2022-01-01 RX ADMIN — Medication 3 MILLILITER(S): at 07:44

## 2022-01-01 RX ADMIN — Medication 3 MILLILITER(S): at 20:02

## 2022-01-01 RX ADMIN — Medication 25 MILLIGRAM(S): at 11:51

## 2022-01-01 RX ADMIN — PIPERACILLIN AND TAZOBACTAM 25 GRAM(S): 4; .5 INJECTION, POWDER, LYOPHILIZED, FOR SOLUTION INTRAVENOUS at 23:04

## 2022-01-01 RX ADMIN — APIXABAN 5 MILLIGRAM(S): 2.5 TABLET, FILM COATED ORAL at 05:15

## 2022-01-01 RX ADMIN — Medication 3 MILLILITER(S): at 19:41

## 2022-01-01 RX ADMIN — Medication 40 MILLIGRAM(S): at 05:15

## 2022-01-01 RX ADMIN — POLYETHYLENE GLYCOL 3350 17 GRAM(S): 17 POWDER, FOR SOLUTION ORAL at 08:47

## 2022-01-01 RX ADMIN — Medication 5 MILLIGRAM(S): at 12:09

## 2022-01-01 RX ADMIN — Medication 25 MILLIGRAM(S): at 18:18

## 2022-01-01 RX ADMIN — Medication 100 MILLIGRAM(S): at 22:12

## 2022-01-01 RX ADMIN — Medication 5 MILLIGRAM(S): at 20:18

## 2022-01-01 RX ADMIN — Medication 40 MILLIGRAM(S): at 21:08

## 2022-01-01 RX ADMIN — FAMOTIDINE 20 MILLIGRAM(S): 10 INJECTION INTRAVENOUS at 11:28

## 2022-01-01 RX ADMIN — Medication 1 TABLET(S): at 11:28

## 2022-01-01 RX ADMIN — Medication 1 TABLET(S): at 13:23

## 2022-01-01 RX ADMIN — Medication 25 MILLIGRAM(S): at 06:04

## 2022-01-01 RX ADMIN — FAMOTIDINE 20 MILLIGRAM(S): 10 INJECTION INTRAVENOUS at 14:08

## 2022-01-01 RX ADMIN — CEFEPIME 100 MILLIGRAM(S): 1 INJECTION, POWDER, FOR SOLUTION INTRAMUSCULAR; INTRAVENOUS at 21:47

## 2022-01-01 RX ADMIN — PIPERACILLIN AND TAZOBACTAM 25 GRAM(S): 4; .5 INJECTION, POWDER, LYOPHILIZED, FOR SOLUTION INTRAVENOUS at 05:43

## 2022-01-01 RX ADMIN — Medication 3 MILLILITER(S): at 10:43

## 2022-01-01 RX ADMIN — Medication 25 MILLIGRAM(S): at 23:25

## 2022-01-01 RX ADMIN — Medication 1 INCH(S): at 20:57

## 2022-01-01 RX ADMIN — Medication 1 TABLET(S): at 11:27

## 2022-01-01 RX ADMIN — Medication 25 MILLIGRAM(S): at 13:23

## 2022-01-01 RX ADMIN — PIPERACILLIN AND TAZOBACTAM 200 GRAM(S): 4; .5 INJECTION, POWDER, LYOPHILIZED, FOR SOLUTION INTRAVENOUS at 20:10

## 2022-01-01 RX ADMIN — POLYETHYLENE GLYCOL 3350 17 GRAM(S): 17 POWDER, FOR SOLUTION ORAL at 11:51

## 2022-01-01 RX ADMIN — Medication 40 MILLIGRAM(S): at 05:25

## 2022-01-01 RX ADMIN — CEFEPIME 100 MILLIGRAM(S): 1 INJECTION, POWDER, FOR SOLUTION INTRAMUSCULAR; INTRAVENOUS at 17:16

## 2022-01-01 RX ADMIN — FAMOTIDINE 20 MILLIGRAM(S): 10 INJECTION INTRAVENOUS at 13:23

## 2022-01-01 RX ADMIN — Medication 40 MILLIGRAM(S): at 17:30

## 2022-01-01 RX ADMIN — Medication 25 MILLIGRAM(S): at 00:15

## 2022-01-01 RX ADMIN — AZITHROMYCIN 255 MILLIGRAM(S): 500 TABLET, FILM COATED ORAL at 04:42

## 2022-01-01 RX ADMIN — Medication 1 TABLET(S): at 17:16

## 2022-01-01 RX ADMIN — AZITHROMYCIN 255 MILLIGRAM(S): 500 TABLET, FILM COATED ORAL at 02:52

## 2022-01-01 RX ADMIN — MORPHINE SULFATE 2 MILLIGRAM(S): 50 CAPSULE, EXTENDED RELEASE ORAL at 11:31

## 2022-01-01 RX ADMIN — Medication 5 MILLIGRAM(S): at 17:45

## 2022-01-01 RX ADMIN — Medication 25 GRAM(S): at 12:26

## 2022-01-01 RX ADMIN — Medication 2.5 MILLIGRAM(S): at 08:38

## 2022-01-01 RX ADMIN — CHLORHEXIDINE GLUCONATE 1 APPLICATION(S): 213 SOLUTION TOPICAL at 06:46

## 2022-01-01 RX ADMIN — CEFEPIME 100 MILLIGRAM(S): 1 INJECTION, POWDER, FOR SOLUTION INTRAMUSCULAR; INTRAVENOUS at 06:15

## 2022-01-01 RX ADMIN — CEFEPIME 100 MILLIGRAM(S): 1 INJECTION, POWDER, FOR SOLUTION INTRAMUSCULAR; INTRAVENOUS at 13:58

## 2022-01-01 RX ADMIN — PIPERACILLIN AND TAZOBACTAM 25 GRAM(S): 4; .5 INJECTION, POWDER, LYOPHILIZED, FOR SOLUTION INTRAVENOUS at 13:16

## 2022-01-01 RX ADMIN — APIXABAN 5 MILLIGRAM(S): 2.5 TABLET, FILM COATED ORAL at 17:03

## 2022-01-01 RX ADMIN — MORPHINE SULFATE 2 MILLIGRAM(S): 50 CAPSULE, EXTENDED RELEASE ORAL at 13:31

## 2022-01-01 RX ADMIN — CEFEPIME 100 MILLIGRAM(S): 1 INJECTION, POWDER, FOR SOLUTION INTRAMUSCULAR; INTRAVENOUS at 23:25

## 2022-01-01 RX ADMIN — Medication 40 MILLIGRAM(S): at 17:16

## 2022-01-01 RX ADMIN — Medication 40 MILLIGRAM(S): at 05:53

## 2022-01-01 RX ADMIN — ROBINUL 0.2 MILLIGRAM(S): 0.2 INJECTION INTRAMUSCULAR; INTRAVENOUS at 18:18

## 2022-01-01 RX ADMIN — Medication 20 MILLIGRAM(S): at 20:56

## 2022-01-01 RX ADMIN — APIXABAN 5 MILLIGRAM(S): 2.5 TABLET, FILM COATED ORAL at 17:52

## 2022-01-01 RX ADMIN — AMLODIPINE BESYLATE 10 MILLIGRAM(S): 2.5 TABLET ORAL at 05:15

## 2022-01-01 RX ADMIN — MORPHINE SULFATE 0.5 MILLIGRAM(S): 50 CAPSULE, EXTENDED RELEASE ORAL at 14:22

## 2022-01-01 RX ADMIN — ENOXAPARIN SODIUM 60 MILLIGRAM(S): 100 INJECTION SUBCUTANEOUS at 05:46

## 2022-01-01 RX ADMIN — CHLORHEXIDINE GLUCONATE 1 APPLICATION(S): 213 SOLUTION TOPICAL at 05:25

## 2022-01-01 RX ADMIN — Medication 40 MILLIGRAM(S): at 05:46

## 2022-01-01 RX ADMIN — MORPHINE SULFATE 0.5 MILLIGRAM(S): 50 CAPSULE, EXTENDED RELEASE ORAL at 12:10

## 2022-01-01 RX ADMIN — Medication 25 MILLIGRAM(S): at 05:05

## 2022-01-01 RX ADMIN — Medication 40 MILLIGRAM(S): at 06:59

## 2022-01-01 RX ADMIN — CEFTRIAXONE 100 MILLIGRAM(S): 500 INJECTION, POWDER, FOR SOLUTION INTRAMUSCULAR; INTRAVENOUS at 03:19

## 2022-01-01 RX ADMIN — Medication 25 MILLIGRAM(S): at 00:56

## 2022-01-01 RX ADMIN — Medication 50 MILLIEQUIVALENT(S): at 17:37

## 2022-01-01 RX ADMIN — Medication 1 TABLET(S): at 14:16

## 2022-01-01 RX ADMIN — Medication 25 MILLIGRAM(S): at 17:26

## 2022-01-01 RX ADMIN — Medication 25 MILLIGRAM(S): at 05:25

## 2022-01-01 RX ADMIN — Medication 25 GRAM(S): at 10:53

## 2022-01-01 RX ADMIN — Medication 3 MILLILITER(S): at 08:59

## 2022-01-01 RX ADMIN — Medication 1 TABLET(S): at 11:51

## 2022-01-01 RX ADMIN — Medication 5 MILLIGRAM(S): at 00:29

## 2022-01-01 RX ADMIN — Medication 40 MILLIGRAM(S): at 06:58

## 2022-01-01 RX ADMIN — LATANOPROST 1 DROP(S): 0.05 SOLUTION/ DROPS OPHTHALMIC; TOPICAL at 21:45

## 2022-01-01 RX ADMIN — Medication 100 MILLIGRAM(S): at 07:16

## 2022-01-01 RX ADMIN — Medication 5 MILLIGRAM(S): at 05:55

## 2022-01-01 RX ADMIN — Medication 40 MILLIGRAM(S): at 17:26

## 2022-01-01 RX ADMIN — Medication 40 MILLIGRAM(S): at 05:47

## 2022-01-01 RX ADMIN — Medication 1 TABLET(S): at 14:08

## 2022-01-01 RX ADMIN — LATANOPROST 1 DROP(S): 0.05 SOLUTION/ DROPS OPHTHALMIC; TOPICAL at 21:58

## 2022-01-01 RX ADMIN — ALBUTEROL 2 PUFF(S): 90 AEROSOL, METERED ORAL at 09:58

## 2022-01-01 RX ADMIN — Medication 25 MILLIGRAM(S): at 17:16

## 2022-01-01 RX ADMIN — Medication 50 MILLIEQUIVALENT(S): at 11:09

## 2022-01-01 RX ADMIN — LATANOPROST 1 DROP(S): 0.05 SOLUTION/ DROPS OPHTHALMIC; TOPICAL at 21:39

## 2022-01-01 RX ADMIN — APIXABAN 5 MILLIGRAM(S): 2.5 TABLET, FILM COATED ORAL at 06:38

## 2022-01-01 RX ADMIN — Medication 20 MILLIGRAM(S): at 11:28

## 2022-01-01 RX ADMIN — MORPHINE SULFATE 2 MILLIGRAM(S): 50 CAPSULE, EXTENDED RELEASE ORAL at 10:42

## 2022-01-01 RX ADMIN — Medication 40 MILLIEQUIVALENT(S): at 23:56

## 2022-01-01 RX ADMIN — Medication 1 TABLET(S): at 11:24

## 2022-01-01 RX ADMIN — CHLORHEXIDINE GLUCONATE 1 APPLICATION(S): 213 SOLUTION TOPICAL at 05:55

## 2022-01-01 RX ADMIN — Medication 40 MILLIGRAM(S): at 22:06

## 2022-01-01 RX ADMIN — Medication 40 MILLIGRAM(S): at 06:38

## 2022-01-01 RX ADMIN — PIPERACILLIN AND TAZOBACTAM 25 GRAM(S): 4; .5 INJECTION, POWDER, LYOPHILIZED, FOR SOLUTION INTRAVENOUS at 05:52

## 2022-01-01 RX ADMIN — Medication 25 MILLIGRAM(S): at 01:45

## 2022-01-01 RX ADMIN — Medication 40 MILLIGRAM(S): at 22:12

## 2022-01-01 RX ADMIN — LATANOPROST 1 DROP(S): 0.05 SOLUTION/ DROPS OPHTHALMIC; TOPICAL at 22:18

## 2022-01-01 RX ADMIN — Medication 50 MILLIEQUIVALENT(S): at 10:54

## 2022-01-01 RX ADMIN — Medication 5 MILLIGRAM(S): at 11:33

## 2022-01-01 RX ADMIN — APIXABAN 5 MILLIGRAM(S): 2.5 TABLET, FILM COATED ORAL at 06:02

## 2022-01-01 RX ADMIN — ENOXAPARIN SODIUM 60 MILLIGRAM(S): 100 INJECTION SUBCUTANEOUS at 05:52

## 2022-01-01 RX ADMIN — AZITHROMYCIN 255 MILLIGRAM(S): 500 TABLET, FILM COATED ORAL at 03:19

## 2022-01-01 RX ADMIN — Medication 25 MILLIGRAM(S): at 14:16

## 2022-01-01 RX ADMIN — AMLODIPINE BESYLATE 10 MILLIGRAM(S): 2.5 TABLET ORAL at 06:02

## 2022-01-01 RX ADMIN — Medication 3 MILLILITER(S): at 02:40

## 2022-01-01 RX ADMIN — Medication 5 MILLIGRAM(S): at 05:21

## 2022-01-01 RX ADMIN — CEFEPIME 100 MILLIGRAM(S): 1 INJECTION, POWDER, FOR SOLUTION INTRAMUSCULAR; INTRAVENOUS at 07:19

## 2022-01-01 RX ADMIN — Medication 25 MILLIGRAM(S): at 17:52

## 2022-01-01 RX ADMIN — Medication 5 MILLIGRAM(S): at 22:42

## 2022-01-01 RX ADMIN — Medication 40 MILLIGRAM(S): at 17:38

## 2022-01-01 RX ADMIN — Medication 40 MILLIGRAM(S): at 05:05

## 2022-01-01 RX ADMIN — CHLORHEXIDINE GLUCONATE 1 APPLICATION(S): 213 SOLUTION TOPICAL at 06:36

## 2022-01-01 RX ADMIN — Medication 40 MILLIGRAM(S): at 17:22

## 2022-01-01 RX ADMIN — Medication 3 MILLILITER(S): at 13:59

## 2022-01-01 RX ADMIN — LATANOPROST 1 DROP(S): 0.05 SOLUTION/ DROPS OPHTHALMIC; TOPICAL at 22:08

## 2022-01-01 RX ADMIN — Medication 25 MILLIGRAM(S): at 05:14

## 2022-01-01 RX ADMIN — LATANOPROST 1 DROP(S): 0.05 SOLUTION/ DROPS OPHTHALMIC; TOPICAL at 21:09

## 2022-01-01 RX ADMIN — MORPHINE SULFATE 2 MILLIGRAM(S): 50 CAPSULE, EXTENDED RELEASE ORAL at 17:27

## 2022-01-01 RX ADMIN — Medication 5 MILLIGRAM(S): at 00:12

## 2022-01-01 RX ADMIN — Medication 25 MILLIGRAM(S): at 00:06

## 2022-01-01 RX ADMIN — CEFTRIAXONE 100 MILLIGRAM(S): 500 INJECTION, POWDER, FOR SOLUTION INTRAMUSCULAR; INTRAVENOUS at 02:49

## 2022-01-01 RX ADMIN — POLYETHYLENE GLYCOL 3350 17 GRAM(S): 17 POWDER, FOR SOLUTION ORAL at 11:24

## 2022-01-01 RX ADMIN — Medication 25 MILLIGRAM(S): at 06:38

## 2022-01-01 RX ADMIN — LATANOPROST 1 DROP(S): 0.05 SOLUTION/ DROPS OPHTHALMIC; TOPICAL at 21:47

## 2022-01-01 RX ADMIN — Medication 40 MILLIEQUIVALENT(S): at 01:13

## 2022-01-01 RX ADMIN — MORPHINE SULFATE 0.5 MILLIGRAM(S): 50 CAPSULE, EXTENDED RELEASE ORAL at 11:51

## 2022-01-01 RX ADMIN — Medication 50 MILLIEQUIVALENT(S): at 13:17

## 2022-01-01 RX ADMIN — Medication 50 MILLIEQUIVALENT(S): at 13:24

## 2022-01-01 RX ADMIN — Medication 5 MILLIGRAM(S): at 10:13

## 2022-01-01 RX ADMIN — APIXABAN 5 MILLIGRAM(S): 2.5 TABLET, FILM COATED ORAL at 17:16

## 2022-01-01 RX ADMIN — Medication 3 MILLILITER(S): at 11:39

## 2022-01-01 RX ADMIN — Medication 5 MILLIGRAM(S): at 05:48

## 2022-01-01 RX ADMIN — Medication 25 MILLIGRAM(S): at 17:17

## 2022-01-01 RX ADMIN — Medication 25 MILLIGRAM(S): at 11:28

## 2022-01-01 RX ADMIN — Medication 40 MILLIGRAM(S): at 18:18

## 2022-01-01 RX ADMIN — Medication 25 GRAM(S): at 17:52

## 2022-01-01 RX ADMIN — Medication 40 MILLIGRAM(S): at 20:18

## 2022-01-01 RX ADMIN — Medication 25 MILLIGRAM(S): at 17:02

## 2022-12-15 NOTE — H&P ADULT - ATTENDING COMMENTS
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   Patient reports that patient has been having worsening SOB associated with non-productive cough for the past week. Today, he was found to have SpO2 in the 70's% which prompted him to be brought to the ED from NH. He was given Duoneb and Decadron by EMS. Initially, patient was placed on BiPAP saturating 99% then to oxygen mask.    He denies fever, chills, rhinorrhea, chest pain. No abdominal or urinary symptoms.     In ED, vitals significant for  RR 38 SpO2 93% on 6L oxygen mask   Laboratory workup significant for WBC 23.81 with 88.7% neutrophils Hb 8.8  ABG's pH 7.34 pCO2 46 pO2 24 HCO3 25 Lactate 4.9  RVP and COVID negative.   Troponin 0.02 pro BNP 3665  Chest X-ray: congestion and bilateral opacities (unofficial read)  Admitted for further management and monitoring to Telemetry.  (15 Dec 2022 23:42)    REVIEW OF SYSTEMS: see cc/HPI   CONSTITUTIONAL: No weakness, fevers or chills  EYES/ENT: No visual changes;  No vertigo or throat pain   NECK: No pain or stiffness  RESPIRATORY: No cough, wheezing, hemoptysis; (+) shortness of breath (+) hypoxia   CARDIOVASCULAR: No chest pain or palpitations  GASTROINTESTINAL: No abdominal or epigastric pain. No nausea, vomiting, or hematemesis; No diarrhea or constipation. No melena or hematochezia.  GENITOURINARY: No dysuria, frequency or hematuria  NEUROLOGICAL: No numbness or weakness  SKIN: No itching, rashes    Physical Exam:   General: WN/WD NAD  Neurology: A&Ox3, nonfocal, follows commands  Eyes: PERRLA/ EOMI  ENT/Neck: Neck supple, trachea midline, No JVD  Respiratory: B/L rales, No wheezing, rhonchi  CV: Normal rate regular rhythm, S1S2, no murmurs, rubs or gallops  Abdominal: Soft, NT, ND +BS,   Extremities: No edema, + peripheral pulses  Skin: No Rashes, Hematoma, Ecchymosis  Incisions:   Tubes:    A/p  Sepsis 2/2 Pneumonia   Acute resp failure w/ hypoxia 2/2 PNA   Metabolic acidosis / Lactic acidosis  -IV Abx   -O2 via NC and pulse xo monitoring   -check urine for strep and Legionella Ag, Procal, MRSA screen   -PRN bronchodilators     Mild trop elevation   A. Fib w/ RVR   -admit to tele   -repeat Trop   -treat infection and then rate control     H/o HF?EF   A. Fib on Eliquis   -agree w/ Echo   -avoid fluid overload     Anemia   -serial CBC and active Type and screen    HTN -amlodipine     Stage IV lung cancer   -c/w outpatient regimen   -seizure precautions

## 2022-12-15 NOTE — H&P ADULT - NSHPPHYSICALEXAM_GEN_ALL_CORE
T(C): 37.1 (12-15-22 @ 19:12), Max: 37.1 (12-15-22 @ 19:12)  HR: 122 (12-15-22 @ 23:13) (122 - 130)  BP: 112/65 (12-15-22 @ 23:13) (112/65 - 132/60)  RR: 18 (12-15-22 @ 23:13) (18 - 38)  SpO2: 99% (12-15-22 @ 23:13) (93% - 99%)    CONSTITUTIONAL: Well groomed, no apparent distress  RESP: No respiratory distress, no use of accessory muscles; decreased breath sounds bilaterally   CV: irregularly irregular; no peripheral edema  GI: Soft, NT, ND, no rebound, no guarding; no palpable masses  NEURO: AAOx3; no focal neurologic deficits T(C): 37.1 (12-15-22 @ 19:12), Max: 37.1 (12-15-22 @ 19:12)  HR: 122 (12-15-22 @ 23:13) (122 - 130)  BP: 112/65 (12-15-22 @ 23:13) (112/65 - 132/60)  RR: 18 (12-15-22 @ 23:13) (18 - 38)  SpO2: 99% (12-15-22 @ 23:13) (93% - 99%)    CONSTITUTIONAL: Well groomed, no apparent distress  RESP: No respiratory distress, no use of accessory muscles; bilateral coarse crackles  CV: irregularly irregular; no peripheral edema  GI: Soft, NT, ND, no rebound, no guarding; no palpable masses  NEURO: AAOx3; no focal neurologic deficits

## 2022-12-15 NOTE — ED PROVIDER NOTE - ATTENDING CONTRIBUTION TO CARE
I personally evaluated patient. I agree with the findings and plan with all documentation on chart except as documented  in my note.    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 O2 sat went in the 70's. Patient reports that patient has been having worsening SOB associated with non-productive cough for the past week.     He was given nebs and steroids by EMS. Patient brought into the Critical Care ED. Patient immediately asssessed and was placed on BIPAP for respiratory distress. Lung ultrasound performed and oxygen titrated. Large bore IV placed and labs sent, CXR done, EKG done.  CXR shows b/l opacities.  Laboratory workup significant for WBC 23.81 with 88.7% neutrophils Hb 8.8  ABG's pH 7.34 pCO2 46 pO2 24 HCO3 25 Lactate 4.9  RVP and COVID negative.   Troponin 0.02 pro BNP 3665  Patient given IV antibiotics and respiratory status improved. Patient no longer needed BiPAP and was placed on nasal cannula.

## 2022-12-15 NOTE — ED PROVIDER NOTE - PHYSICAL EXAMINATION
CONST: elderly male, chronically ill appearing  HEAD:  normocephalic, atraumatic  EYES:  conjunctivae without injection, drainage or discharge  ENMT:  nasal mucosa moist; mouth moist without ulcerations or lesions; throat moist without erythema, exudate, ulcerations or lesions  NECK:  supple  CARDIAC: tachycardic  RESP:  bilateral crackles, tacchypneic, subcostal retractions, increased work of breathing  ABDOMEN:  soft, nontender, nondistended  MUSCULOSKELETAL/NEURO: awake and alert, normal movement, normal tone  SKIN:  no rash

## 2022-12-15 NOTE — ED PROVIDER NOTE - OBJECTIVE STATEMENT
Pt is a 83 y/o male with PMH of a fib on eliquis, lung CA s/p chemo, COPD not on home O2, HTN Pt is a 83 y/o male with PMH of a fib on eliquis, lung CA s/p chemo, COPD not on home O2, HTN and GERD BIBEMS from [facility] for SOB x few days. Pt reports SOB worsened today associated with cough. No fever, chest pain, nausea, vomiting, abdominal pain or diarrhea. Pt found to be hypoxic, satting 70s on room air. Given duonebs and decadron by EMS. On arrival, pt tachypneic with increased work of breathing, placed on BIPAP, satting 99%.

## 2022-12-15 NOTE — H&P ADULT - HISTORY OF PRESENT ILLNESS
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   Patient reports that patient has been having worsening SOB associated with non-productive cough. Today, he was found to have SpO2 in the 70's% which prompted him to be brought to the ED from NH. HE was given Duoneb and Decadron by EMS. Initially, patient was placed on BiPAP saturating 99% then to oxygen mask.    He denies fever, chills, rhinorrhea, chest pain. No abdominal or urinary symptoms. No recent sick contact. No recent hospitalization.     In ED, vitals significant for  RR 38 SpO2 93% on 6L oxygen mask   Laboratory workup significant for WBC 23.81 with 88.7% neutrophils Hb 8.8  ABG's pH 7.34 pCO2 46 pO2 24 HCO3 25 Lactate 4.9  RVP and COVID negative.   Troponin 0.02 pro BNP 3665  Chest X-ray: congestion and bilateral opacities (unofficial read)  Admitted for further management and monitoring to Telemetry.  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   Patient reports that patient has been having worsening SOB associated with non-productive cough for the past week. Today, he was found to have SpO2 in the 70's% which prompted him to be brought to the ED from NH. He was given Duoneb and Decadron by EMS. Initially, patient was placed on BiPAP saturating 99% then to oxygen mask.    He denies fever, chills, rhinorrhea, chest pain. No abdominal or urinary symptoms.     In ED, vitals significant for  RR 38 SpO2 93% on 6L oxygen mask   Laboratory workup significant for WBC 23.81 with 88.7% neutrophils Hb 8.8  ABG's pH 7.34 pCO2 46 pO2 24 HCO3 25 Lactate 4.9  RVP and COVID negative.   Troponin 0.02 pro BNP 3665  Chest X-ray: congestion and bilateral opacities (unofficial read)  Admitted for further management and monitoring to Telemetry.

## 2022-12-15 NOTE — H&P ADULT - NSHPLABSRESULTS_GEN_ALL_CORE
8.8    23.81 )-----------( 406      ( 15 Dec 2022 19:03 )             28.7       12-15    136  |  98  |  23<H>  ----------------------------<  127<H>  4.9   |  24  |  0.7    Ca    8.8      15 Dec 2022 19:03    TPro  5.6<L>  /  Alb  2.8<L>  /  TBili  0.4  /  DBili  x   /  AST  20  /  ALT  12  /  AlkPhos  129<H>  12-15

## 2022-12-15 NOTE — ED PROCEDURE NOTE - CPROC ED INFORMED CONSENT1
----- Message from Jamal Mosley sent at 6/9/2020  9:48 AM CDT -----  Type: Needs Medical Advice  Who Called:  Patient    Best Call Back Number: [phone]  Additional Information: Patient states that she would like a callback regarding needing orders for Covid testing before her procedure     Benefits, risks, and possible complications of procedure explained to patient/caregiver who verbalized understanding and gave verbal consent.

## 2022-12-15 NOTE — ED ADULT NURSE NOTE - OBJECTIVE STATEMENT
Patient c/o SOB x 2 hours, pmh lung cancer. Denies nausea, vomiting, fever, chills. Patient placed on BiPAP

## 2022-12-15 NOTE — ED PROVIDER NOTE - CLINICAL SUMMARY MEDICAL DECISION MAKING FREE TEXT BOX
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 O2 sat went in the 70's. Patient reports that patient has been having worsening SOB associated with non-productive cough for the past week.     He was given nebs and steroids by EMS. Patient brought into the Critical Care ED. Patient immediately asssessed and was placed on BIPAP for respiratory distress. Lung ultrasound performed and oxygen titrated. Large bore IV placed and labs sent, CXR done, EKG done.  CXR shows b/l opacities.  Laboratory workup significant for WBC 23.81 with 88.7% neutrophils Hb 8.8  ABG's pH 7.34 pCO2 46 pO2 24 HCO3 25 Lactate 4.9  RVP and COVID negative.   Troponin 0.02 pro BNP 3665  Patient given IV antibiotics and respiratory status improved. Patient no longer needed BiPAP and was placed on nasal cannula.

## 2022-12-15 NOTE — ED ADULT NURSE NOTE - NSFALLRSKASSISTTYPE_ED_ALL_ED
Standing/Walking Bilateral Helical Rim Advancement Flap Text: The defect edges were debeveled with a #15 blade scalpel.  Given the location of the defect and the proximity to free margins (helical rim) a bilateral helical rim advancement flap was deemed most appropriate.  Using a sterile surgical marker, the appropriate advancement flaps were drawn incorporating the defect and placing the expected incisions between the helical rim and antihelix where possible.  The area thus outlined was incised through and through with a #15 scalpel blade.  With a skin hook and iris scissors, the flaps were gently and sharply undermined and freed up.

## 2022-12-15 NOTE — H&P ADULT - ASSESSMENT
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 He was admitted for sepsis secondary to PNA.     #Sepsis secondary to PNA  #Hypoxia and SOB likely secondary to COPD exacerbation   Was found to have SpO2 in the 70's%; placed initially on BiPAP saturating 99% then to oxygen mask.    - Sepsis on admission ( RR 38 WBC 23.81 with 88.7% neutrophils)  - ABG's pH 7.34 pCO2 46 pO2 24 HCO3 25 Lactate 4.9  - RVP and COVID negative.   - Chest X-ray: congestion and bilateral opacities (unofficial read)  - Currently on SpO2 93% on 6L oxygen mask   - s/p Levaquin in ED  - Started Ceftriaxone and Azithromycin   - Procalcitonin, urine Strep and legionella   - Follow up blood cultures   - Wean O2 as tolerated    #Troponemia likely secondary to NSTEMI type II  - Troponin 0.02  - EKG: A.fib  - No active chest pain  - Trend Troponin  - Follow up A1c and lipid profile   - Admit to Telemetry     #History of HF?  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 He was admitted for sepsis secondary to PNA.     #Sepsis secondary to PNA  #Hypoxia and SOB likely secondary to COPD exacerbation   Was found to have SpO2 in the 70's%; placed initially on BiPAP saturating 99% then to oxygen mask.    - Sepsis on admission ( RR 38 WBC 23.81 with 88.7% neutrophils)  - ABG's pH 7.34 pCO2 46 pO2 24 HCO3 25 Lactate 4.9  - RVP and COVID negative.   - Chest X-ray: congestion and bilateral opacities (unofficial read)  - Currently on SpO2 93% on 6L oxygen mask   - s/p Levaquin in ED  - Started Ceftriaxone and Azithromycin   - Procalcitonin, urine Strep and legionella   - Follow up blood cultures   - c/w Duoneb   - Wean O2 as tolerated    #Troponemia likely secondary to NSTEMI type II  - Troponin 0.02  - EKG: A.fib  - No active chest pain  - Trend Troponin  - Follow up A1c and lipid profile   - Admit to Telemetry     #History of HF?  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 He was admitted for sepsis secondary to PNA.     #Sepsis secondary to PNA  #Hypoxia and SOB likely secondary to COPD exacerbation vs. HF exacerbation?  Was found to have SpO2 in the 70's%; placed initially on BiPAP saturating 99% then to oxygen mask.    - Sepsis on admission ( RR 38 WBC 23.81 with 88.7% neutrophils)  - ABG's pH 7.34 pCO2 46 pO2 24 HCO3 25 Lactate 4.9  - RVP and COVID negative.   - Chest X-ray: congestion and bilateral opacities (unofficial read)  - Currently on SpO2 93% on 3L NC  - s/p Levaquin in ED  - Started Ceftriaxone and Azithromycin   - Procalcitonin, MRSA, urine Strep and legionella   - Follow up blood cultures   - c/w Duoneb   - Wean O2 as tolerated    #Troponemia likely secondary to NSTEMI type II  - Troponin 0.02  - EKG: A.fib  - No active chest pain  - Trend Troponin  - Follow up A1c and lipid profile   - Admit to Telemetry     #History of HF?  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 He was admitted for sepsis secondary to PNA.     #Sepsis secondary to PNA  #Hypoxia and SOB likely secondary to COPD exacerbation vs. HF exacerbation?  #Metabolic acidosis secondary to lactic acidosis  Was found to have SpO2 in the 70's%; placed initially on BiPAP saturating 99% then to oxygen mask.    - Sepsis on admission ( RR 38 WBC 23.81 with 88.7% neutrophils)  - ABG's pH 7.34 pCO2 46 pO2 24 HCO3 25 Lactate 4.9  - RVP and COVID negative.   - Chest X-ray: congestion and bilateral opacities (unofficial read)  - Currently on SpO2 93% on 3L NC  - s/p Levaquin in ED  - Started Ceftriaxone and Azithromycin   - Procalcitonin, MRSA, urine Strep and legionella   - Follow up blood cultures   - Follow up repeat lactate   - c/w Duoneb   - Wean O2 as tolerated    #Troponemia likely secondary to NSTEMI type II  - Troponin 0.02  - EKG: A.fib  - No active chest pain  - Trend Troponin  - Follow up A1c and lipid profile   - Admit to Telemetry     #History of HF?  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

## 2022-12-18 NOTE — PATIENT PROFILE ADULT - FALL HARM RISK - HARM RISK INTERVENTIONS

## 2022-12-18 NOTE — ED ADULT NURSE REASSESSMENT NOTE - NS ED NURSE REASSESS COMMENT FT1
Patient Aox4, in no acute distress- patient switched off NRB on 5L NC at this time. Cardiac monitoring continued.

## 2022-12-18 NOTE — PATIENT PROFILE ADULT - FUNCTIONAL ASSESSMENT - BASIC MOBILITY 6.
1-calculated by average/Not able to assess (calculate score using [facility] averaging method)

## 2022-12-18 NOTE — CONSULT NOTE ADULT - SUBJECTIVE AND OBJECTIVE BOX
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   Patient reports that patient has been having worsening SOB associated with non-productive cough for the past week. Today, he was found to have SpO2 in the 70's% which prompted him to be brought to the ED from NH. He was given Duoneb and Decadron by EMS. Initially, patient was placed on BiPAP saturating 99% then to oxygen mask.    He denies fever, chills, rhinorrhea, chest pain. No abdominal or urinary symptoms.     In ED, vitals significant for  RR 38 SpO2 93% on 6L oxygen mask   Laboratory workup significant for WBC 23.81 with 88.7% neutrophils Hb 8.8  ABG's pH 7.34 pCO2 46 pO2 24 HCO3 25 Lactate 4.9  RVP and COVID negative.   Troponin 0.02 pro BNP 3665  Admitted for further management and monitoring to Telemetry.  called to rudy, this am on NRM      PAST MEDICAL & SURGICAL HISTORY:      SOCIAL HX:   Smoking   EX        REVIEW OF SYSTEMS see hpi    Allergies    Allergy Status Unknown    Intolerances        albuterol/ipratropium for Nebulization 3 milliLiter(s) Nebulizer every 6 hours  amLODIPine   Tablet 10 milliGRAM(s) Oral daily  apixaban 5 milliGRAM(s) Oral every 12 hours  azithromycin  IVPB 500 milliGRAM(s) IV Intermittent every 24 hours  calcium carbonate    500 mG (Tums) Chewable 1 Tablet(s) Chew daily  cefepime   IVPB      cefepime   IVPB 2000 milliGRAM(s) IV Intermittent every 8 hours  famotidine    Tablet 20 milliGRAM(s) Oral daily  furosemide   Injectable 40 milliGRAM(s) IV Push every 12 hours  latanoprost 0.005% Ophthalmic Solution 1 Drop(s) Both EYES at bedtime  metoprolol tartrate 25 milliGRAM(s) Oral every 6 hours  multivitamin 1 Tablet(s) Oral daily  : Home Meds:      PHYSICAL EXAM    ICU Vital Signs Last 24 Hrs    HR: 104 (18 Dec 2022 08:49) (90 - 974)  BP: 101/77 (18 Dec 2022 08:49) (94/60 - 106/-)  RR: 19 (18 Dec 2022 08:49) (18 - 19)  SpO2: 96% (18 Dec 2022 08:49) (90% - 98%)    O2 Parameters below as of 18 Dec 2022 08:49  Patient On (Oxygen Delivery Method): nasal cannula  O2 Flow (L/min): 5        General: ILL looking, cachectic  Lungs: Bilateral rhonchi  Cardiovascular: THOMAS 2.6  Abdomen: Soft, Positive BS  Extremities: No clubbing  Neurological: Non focal         LABS:                          9.6    20.31 )-----------( 360      ( 18 Dec 2022 08:04 )             30.4                                               12-18    137  |  95<L>  |  22<H>  ----------------------------<  110<H>  2.8<L>   |  29  |  0.6<L>    Ca    8.8      18 Dec 2022 08:04  Mg     1.6     12-18    TPro  5.3<L>  /  Alb  2.7<L>  /  TBili  0.3  /  DBili  x   /  AST  12  /  ALT  10  /  AlkPhos  101  12-17                                                                                           LIVER FUNCTIONS - ( 17 Dec 2022 20:49 )  Alb: 2.7 g/dL / Pro: 5.3 g/dL / ALK PHOS: 101 U/L / ALT: 10 U/L / AST: 12 U/L / GGT: x                                                  Culture - Blood (collected 16 Dec 2022 08:44)  Source: .Blood None  Preliminary Report (17 Dec 2022 18:01):    No growth to date.                                                                                         MEDICATIONS  (STANDING):  albuterol/ipratropium for Nebulization 3 milliLiter(s) Nebulizer every 6 hours  amLODIPine   Tablet 10 milliGRAM(s) Oral daily  apixaban 5 milliGRAM(s) Oral every 12 hours  azithromycin  IVPB 500 milliGRAM(s) IV Intermittent every 24 hours  calcium carbonate    500 mG (Tums) Chewable 1 Tablet(s) Chew daily  cefepime   IVPB      cefepime   IVPB 2000 milliGRAM(s) IV Intermittent every 8 hours  famotidine    Tablet 20 milliGRAM(s) Oral daily  furosemide   Injectable 40 milliGRAM(s) IV Push every 12 hours  latanoprost 0.005% Ophthalmic Solution 1 Drop(s) Both EYES at bedtime  metoprolol tartrate 25 milliGRAM(s) Oral every 6 hours  multivitamin 1 Tablet(s) Oral daily    MEDICATIONS  (PRN):

## 2022-12-19 NOTE — HOSPICE CARE NOTE - CONVESATION DETAILS
Family needs a safe D/C plan. Patient as only a resident of [facility] for a few hours. Patient is not Medicaid eligible at this time. Family needs to discuss what SNF they would like patient transferred to. Hospice to follow up tomorrow.

## 2022-12-19 NOTE — CONSULT NOTE ADULT - SUBJECTIVE AND OBJECTIVE BOX
TARIQ JUSTIN  82y, Male  Allergy: Allergy Status Unknown      All historical available data reviewed.    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   Patient reports that patient has been having worsening SOB associated with non-productive cough for the past week. Today, he was found to have SpO2 in the 70's% which prompted him to be brought to the ED from NH. He was given Duoneb and Decadron by EMS. Initially, patient was placed on BiPAP saturating 99% then to oxygen mask.    He denies fever, chills, rhinorrhea, chest pain. No abdominal or urinary symptoms.     In ED, vitals significant for  RR 38 SpO2 93% on 6L oxygen mask   Laboratory workup significant for WBC 23.81 with 88.7% neutrophils Hb 8.8  ABG's pH 7.34 pCO2 46 pO2 24 HCO3 25 Lactate 4.9  RVP and COVID negative.   Troponin 0.02 pro BNP 3665  Chest X-ray: congestion and bilateral opacities (unofficial read)  Admitted for further management and monitoring to Telemetry.  (15 Dec 2022 23:42)    FAMILY HISTORY:    PAST MEDICAL & SURGICAL HISTORY:        VITALS:  T(F): 96.5, Max: 96.6 (12-18-22 @ 21:04)  HR: 98  BP: 112/77  RR: 18Vital Signs Last 24 Hrs  T(C): 35.8 (19 Dec 2022 05:15), Max: 35.9 (18 Dec 2022 21:04)  T(F): 96.5 (19 Dec 2022 05:15), Max: 96.6 (18 Dec 2022 21:04)  HR: 98 (19 Dec 2022 05:15) (74 - 110)  BP: 112/77 (19 Dec 2022 05:15) (102/61 - 112/77)  BP(mean): --  RR: 18 (19 Dec 2022 05:15) (15 - 19)  SpO2: 95% (19 Dec 2022 05:15) (94% - 95%)    Parameters below as of 19 Dec 2022 05:15  Patient On (Oxygen Delivery Method): BiPAP/CPAP        TESTS & MEASUREMENTS:                        9.5    16.03 )-----------( 303      ( 19 Dec 2022 05:47 )             29.1     12-19    142  |  100  |  27<H>  ----------------------------<  143<H>  3.2<L>   |  33<H>  |  0.7    Ca    8.8      19 Dec 2022 05:47  Mg     2.1     12-19    TPro  5.2<L>  /  Alb  2.8<L>  /  TBili  0.5  /  DBili  x   /  AST  7   /  ALT  8   /  AlkPhos  102  12-19    LIVER FUNCTIONS - ( 19 Dec 2022 05:47 )  Alb: 2.8 g/dL / Pro: 5.2 g/dL / ALK PHOS: 102 U/L / ALT: 8 U/L / AST: 7 U/L / GGT: x             Culture - Blood (collected 12-16-22 @ 08:44)  Source: .Blood None  Preliminary Report (12-17-22 @ 18:01):    No growth to date.            RADIOLOGY & ADDITIONAL TESTS:  Personal review of radiological diagnostics performed  Echo and EKG results noted when applicable.     MEDICATIONS:  albuterol/ipratropium for Nebulization 3 milliLiter(s) Nebulizer every 6 hours  calcium carbonate    500 mG (Tums) Chewable 1 Tablet(s) Chew daily  cefepime   IVPB      cefepime   IVPB 2000 milliGRAM(s) IV Intermittent every 8 hours  enoxaparin Injectable 60 milliGRAM(s) SubCutaneous every 12 hours  famotidine    Tablet 20 milliGRAM(s) Oral daily  furosemide   Injectable 40 milliGRAM(s) IV Push every 12 hours  latanoprost 0.005% Ophthalmic Solution 1 Drop(s) Both EYES at bedtime  methylPREDNISolone sodium succinate Injectable 40 milliGRAM(s) IV Push two times a day  metoprolol tartrate Injectable 5 milliGRAM(s) IV Push every 6 hours  metroNIDAZOLE  IVPB 500 milliGRAM(s) IV Intermittent every 8 hours  morphine  - Injectable 0.5 milliGRAM(s) IV Push every 2 hours PRN  multivitamin 1 Tablet(s) Oral daily  potassium chloride  20 mEq/100 mL IVPB 20 milliEquivalent(s) IV Intermittent every 2 hours      ANTIBIOTICS:  cefepime   IVPB      cefepime   IVPB 2000 milliGRAM(s) IV Intermittent every 8 hours  metroNIDAZOLE  IVPB 500 milliGRAM(s) IV Intermittent every 8 hours

## 2022-12-20 NOTE — CONSULT NOTE ADULT - PROBLEM SELECTOR RECOMMENDATION 9
-DNR/DNI  -Plan for comfort meausres only with BIPAP removal after family visits tomorrow  -continue current medical management for now  -Will follow

## 2022-12-20 NOTE — CONSULT NOTE ADULT - SUBJECTIVE AND OBJECTIVE BOX
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   Patient reports that patient has been having worsening SOB associated with non-productive cough for the past week. Today, he was found to have SpO2 in the 70's% which prompted him to be brought to the ED from NH. He was given Duoneb and Decadron by EMS. Initially, patient was placed on BiPAP saturating 99% then to oxygen mask.    He denies fever, chills, rhinorrhea, chest pain. No abdominal or urinary symptoms.     In ED, vitals significant for  RR 38 SpO2 93% on 6L oxygen mask   Laboratory workup significant for WBC 23.81 with 88.7% neutrophils Hb 8.8  ABG's pH 7.34 pCO2 46 pO2 24 HCO3 25 Lactate 4.9  RVP and COVID negative.   Troponin 0.02 pro BNP 3665  Chest X-ray: congestion and bilateral opacities (unofficial read)  Admitted for further management and monitoring to Telemetry.  (15 Dec 2022 23:42)    PERTINENT PM/SXH:       FAMILY HISTORY:    ITEMS NOT CHECKED ARE NOT PRESENT    SOCIAL HISTORY:   Significant other/partner[ ]  Children[ ]  Taoism/Spirituality:  Substance hx:  [ ]   Tobacco hx:  [ ]   Alcohol hx: [ ]   Living Situation: [ ]Home  [ ]Long term care  [ ]Rehab [ ]Other  Home Services: [ ] HHA [ ] Visting RN [ ] Hospice  Occupation:  Home Opioid hx:  [ ] Y [ ] N [ ] I-Stop Reference No:    ADVANCE DIRECTIVES:    [ ] Full Code [ ] DNR  MOLST  [ ]  Living Will  [ ]   DECISION MAKER(s):  [ ] Health Care Proxy(s)  [ ] Surrogate(s)  [ ] Guardian           Name(s): Phone Number(s):    BASELINE (I)ADL(s) (prior to admission):  [place]: [ ]Total  [ ] Moderate [ ]Dependent  Palliative Performance Status Version 2:         %    http://npcrc.org/files/news/palliative_performance_scale_ppsv2.pdf    Allergies    Allergy Status Unknown    Intolerances    MEDICATIONS  (STANDING):  albuterol/ipratropium for Nebulization 3 milliLiter(s) Nebulizer every 6 hours  calcium carbonate    500 mG (Tums) Chewable 1 Tablet(s) Chew daily  chlorhexidine 2% Cloths 1 Application(s) Topical <User Schedule>  enoxaparin Injectable 60 milliGRAM(s) SubCutaneous every 12 hours  famotidine    Tablet 20 milliGRAM(s) Oral daily  furosemide   Injectable 40 milliGRAM(s) IV Push every 12 hours  latanoprost 0.005% Ophthalmic Solution 1 Drop(s) Both EYES at bedtime  levoFLOXacin IVPB 500 milliGRAM(s) IV Intermittent every 24 hours  levoFLOXacin IVPB      methylPREDNISolone sodium succinate Injectable 40 milliGRAM(s) IV Push two times a day  metoprolol tartrate Injectable 5 milliGRAM(s) IV Push every 6 hours  multivitamin 1 Tablet(s) Oral daily  piperacillin/tazobactam IVPB.. 3.375 Gram(s) IV Intermittent every 8 hours    MEDICATIONS  (PRN):  morphine  - Injectable 0.5 milliGRAM(s) IV Push every 2 hours PRN Moderate Pain (4 - 6)    PRESENT SYMPTOMS: [ ]Unable to obtain due to poor mentation   Source if other than patient:  [ ]Family   [ ]Team     Pain: [ ]yes [ ]no  QOL impact -   Location -                    Aggravating factors -  Quality -  Radiation -  Timing-  Severity (0-10 scale):  Minimal acceptable level (0-10 scale):     CPOT:    https://www.[email or web address]/getattachment/[email or web address]/Critical-Care-Pain-Observation-Tool-(CPOT)      PAIN AD Score:     http://geriatrictoolkit.[email or web address]/cog/painad.pdf (press ctrl +  left click to view)      Dyspnea:                           [ ]Mild [ ]Moderate [ ]Severe [ ]None  Anxiety:                             [ ]Mild [ ]Moderate [ ]Severe [ ]None  Fatigue:                             [ ]Mild [ ]Moderate [ ]Severe [ ]None  Nausea:                             [ ]Mild [ ]Moderate [ ]Severe [ ]None  Loss of appetite:              [ ]Mild [ ]Moderate [ ]Severe [ ]None  Constipation:                    [ ]Mild [ ]Moderate [ ]Severe [ ]None    Other Symptoms:  [ ]All other review of systems negative     Palliative Performance Status Version 2:         %    http://npcrc.org/files/news/palliative_performance_scale_ppsv2.pdf  PHYSICAL EXAM:  Vital Signs Last 24 Hrs  T(C): 36.1 (20 Dec 2022 05:21), Max: 36.1 (20 Dec 2022 05:21)  T(F): 97 (20 Dec 2022 05:21), Max: 97 (20 Dec 2022 05:21)  HR: 109 (20 Dec 2022 13:34) (80 - 144)  BP: 113/69 (20 Dec 2022 12:13) (98/59 - 136/82)  BP(mean): 86 (20 Dec 2022 12:13) (86 - 90)  RR: 20 (20 Dec 2022 10:09) (18 - 20)  SpO2: 97% (20 Dec 2022 13:34) (94% - 98%)    Parameters below as of 20 Dec 2022 13:34  Patient On (Oxygen Delivery Method): nasal cannula  O2 Flow (L/min): 5   I&O's Summary    19 Dec 2022 07:01  -  20 Dec 2022 07:00  --------------------------------------------------------  IN: 550 mL / OUT: 1400 mL / NET: -850 mL    20 Dec 2022 07:01  -  20 Dec 2022 14:59  --------------------------------------------------------  IN: 0 mL / OUT: 200 mL / NET: -200 mL        GENERAL:  [ ] No acute distress [ ]Lethargic  [ ]Unarousable  [ ]Verbal  [ ]Non-Verbal [ ]Cachexia    BEHAVIORAL/PSYCH:  [ ]Alert and Oriented x  [ ] Anxiety [ ] Delirium [ ] Agitation [ ] Calm   EYES: [ ] No scleral icterus [ ] Scleral icterus [ ] Closed  ENMT:  [ ]Dry mouth  [ ]No external oral lesions [ ] No external ear or nose lesions  CARDIOVASCULAR:  [ ]Regular [ ]Irregular [ ]Tachy [ ]Not Tachy  [ ]Cabrera [ ] Edema [ ] No edema  PULMONARY:  [ ]Tachypnea  [ ]Audible excessive secretions [ ] No labored breathing [ ] labored breathing  GASTROINTESTINAL: [ ]Soft  [ ]Distended  [ ]Not distended [ ]Non tender [ ]Tender  MUSCULOSKELETAL: [ ]No clubbing [ ] clubbing  [ ] No cyanosis [ ] cyanosis  NEUROLOGIC: [ ]No focal deficits  [ ]Follows commands  [ ]Does not follow commands  [ ]Cognitive impairment  [ ]Dysphagia  [ ]Dysarthria  [ ]Paresis   SKIN: [ ] Jaundiced [ ] Non-jaundiced [ ]Rash [ ]No Rash [ ] Warm [ ] Dry  MISC/LINES: [ ] ET tube [ ] Trach [ ]NGT/OGT [ ]PEG [ ]Pitts    CRITICAL CARE:  [ ] Shock Present  [ ]Septic [ ]Cardiogenic [ ]Neurologic [ ]Hypovolemic  [ ]  Vasopressors [ ]  Inotropes   [ ]Respiratory failure present [ ]Mechanical ventilation [ ]Non-invasive ventilatory support [ ]High flow  [ ]Acute  [ ]Chronic [ ]Hypoxic  [ ]Hypercarbic [ ]Other  [ ]Other organ failure     LABS: reviewed by me                        9.1    23.73 )-----------( 309      ( 20 Dec 2022 05:54 )             29.1   12-20    144  |  101  |  38<H>  ----------------------------<  118<H>  3.7   |  31  |  0.8    Ca    9.0      20 Dec 2022 05:54  Mg     2.2     12-20    TPro  5.2<L>  /  Alb  2.4<L>  /  TBili  0.6  /  DBili  x   /  AST  7   /  ALT  6   /  AlkPhos  94  12-20        RADIOLOGY & ADDITIONAL STUDIES: reviewed by me    EKG: reviewed by me      PROTEIN CALORIE MALNUTRITION PRESENT: [ ]mild [ ]moderate [ ]severe [ ]underweight [ ]morbid obesity  https://www.andeal.org/vault/5410/web/files/ONC/Table_Clinical%20Characteristics%20to%20Document%20Malnutrition-White%20JV%20et%20al%202012.pdf      Weight (kg): 59.8 (12-19-22 @ 05:15)    [ ]PPSV2 < or = to 30% [ ]significant weight loss  [ ]poor nutritional intake  [ ]anasarca      [ ]Artificial Nutrition      REFERRALS:   [ ]Chaplaincy  [ ]Hospice  [ ]Child Life  [ ]Social Work  [ ]Case management [ ]Holistic Therapy     Goals of Care Document:    CC:  SOB    HPI:  82 year old patient, known to have  A.josiah on Eliquis, 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   Patient reports that patient has been having worsening SOB associated with non-productive cough for the past week. Today, he was found to have SpO2 in the 70's% which prompted him to be brought to the ED from NH. He was given Duoneb and Decadron by EMS. Initially, patient was placed on BiPAP saturating 99% then to oxygen mask.    He denies fever, chills, rhinorrhea, chest pain. No abdominal or urinary symptoms.     In ED, vitals significant for  RR 38 SpO2 93% on 6L oxygen mask   Laboratory workup significant for WBC 23.81 with 88.7% neutrophils Hb 8.8  ABG's pH 7.34 pCO2 46 pO2 24 HCO3 25 Lactate 4.9  RVP and COVID negative.   Troponin 0.02 pro BNP 3665  Chest X-ray: congestion and bilateral opacities (unofficial read)  Admitted for further management and monitoring to Telemetry.  (15 Dec 2022 23:42)    PERTINENT PM/SXH:   Stage IV lung cancer with mets to brain  COPD hTN    FAMILY HISTORY:  Unable to determine from patient    ITEMS NOT CHECKED ARE NOT PRESENT    SOCIAL HISTORY:   Significant other/partner[ ]  Children[ ]  Confucianist/Spirituality:  Substance hx:  [ ]   Tobacco hx:  [ ]   Alcohol hx: [ ]   Living Situation: [ ]Home  [ x]Long term care  [ ]Rehab [ ]Other  Home Services: [ ] HHA [ ] Visting RN [ ] Hospice  Occupation:  Home Opioid hx:  [ ] Y [ ] N [x ] I-Stop Reference No:  Reference #: [id number]    Others' Prescriptions  Patient Name: Zach AddisonBirth Date: 1940  Address: [address] [place] [id number]: Male  Rx Written	Rx Dispensed	Drug	Quantity	Days Supply	Prescriber Name  05/19/2022	05/21/2022	pregabalin 25 mg capsule	30	30	Geeta Mcclure J (MD)  Prescriber Rosa # [id number]  Payment Method Insurance  Dispenser [facility]    ADVANCE DIRECTIVES:    [ ] Full Code [x ] DNR  MOLST  [ ]  Living Will  [ ]   DECISION MAKER(s):  [ ] Health Care Proxy(s)  [x ] Surrogate(s)  [ ] Guardian           Name(s): Phone Number(s): Wife    BASELINE (I)ADL(s) (prior to admission):  [place]: [ ]Total  [ x] Moderate [ ]Dependent  Palliative Performance Status Version 2:         50%    http://[facility].org/files/news/palliative_performance_scale_ppsv2.pdf    Allergies  Allergy Status Unknown    Intolerances    MEDICATIONS  (STANDING):  albuterol/ipratropium for Nebulization 3 milliLiter(s) Nebulizer every 6 hours  calcium carbonate    500 mG (Tums) Chewable 1 Tablet(s) Chew daily  chlorhexidine 2% Cloths 1 Application(s) Topical <User Schedule>  enoxaparin Injectable 60 milliGRAM(s) SubCutaneous every 12 hours  famotidine    Tablet 20 milliGRAM(s) Oral daily  furosemide   Injectable 40 milliGRAM(s) IV Push every 12 hours  latanoprost 0.005% Ophthalmic Solution 1 Drop(s) Both EYES at bedtime  levoFLOXacin IVPB 500 milliGRAM(s) IV Intermittent every 24 hours  levoFLOXacin IVPB      methylPREDNISolone sodium succinate Injectable 40 milliGRAM(s) IV Push two times a day  metoprolol tartrate Injectable 5 milliGRAM(s) IV Push every 6 hours  multivitamin 1 Tablet(s) Oral daily  piperacillin/tazobactam IVPB.. 3.375 Gram(s) IV Intermittent every 8 hours    MEDICATIONS  (PRN):  morphine  - Injectable 0.5 milliGRAM(s) IV Push every 2 hours PRN Moderate Pain (4 - 6)    PRESENT SYMPTOMS: [ ]Unable to obtain due to poor mentation   Source if other than patient:  [ ]Family   [ ]Team     Pain: [ ]yes [x ]no  QOL impact -   Location -                    Aggravating factors -  Quality -  Radiation -  Timing-  Severity (0-10 scale):  Minimal acceptable level (0-10 scale):         Dyspnea:                           [ ]Mild [ ]Moderate [ ]Severe [x ]None  Anxiety:                             [ ]Mild [ ]Moderate [ ]Severe [x ]None  Fatigue:                             [ ]Mild [ ]Moderate [ ]Severe [x ]None  Nausea:                             [ ]Mild [ ]Moderate [ ]Severe [x ]None  Loss of appetite:              [ ]Mild [ ]Moderate [ ]Severe [x ]None  Constipation:                    [ ]Mild [ ]Moderate [ ]Severe [x ]None    Other Symptoms:  [x ]All other review of systems negative     Palliative Performance Status Version 2:         30%    http://[facility].org/files/news/palliative_performance_scale_[email or web address].pdf    PHYSICAL EXAM:  Vital Signs Last 24 Hrs  T(C): 36.1 (20 Dec 2022 05:21), Max: 36.1 (20 Dec 2022 05:21)  T(F): 97 (20 Dec 2022 05:21), Max: 97 (20 Dec 2022 05:21)  HR: 109 (20 Dec 2022 13:34) (80 - 144)  BP: 113/69 (20 Dec 2022 12:13) (98/59 - 136/82)  BP(mean): 86 (20 Dec 2022 12:13) (86 - 90)  RR: 20 (20 Dec 2022 10:09) (18 - 20)  SpO2: 97% (20 Dec 2022 13:34) (94% - 98%)    Parameters below as of 20 Dec 2022 13:34  Patient On (Oxygen Delivery Method): nasal cannula  O2 Flow (L/min): 5   I&O's Summary    19 Dec 2022 07:01  -  20 Dec 2022 07:00  --------------------------------------------------------  IN: 550 mL / OUT: 1400 mL / NET: -850 mL    20 Dec 2022 07:01  -  20 Dec 2022 14:59  --------------------------------------------------------  IN: 0 mL / OUT: 200 mL / NET: -200 mL        GENERAL:  [ ] No acute distress [ ]Lethargic  [ ]Unarousable  [x ]Verbal  [ ]Non-Verbal [ ]Cachexia    BEHAVIORAL/PSYCH:  [x ]Alert and Oriented x 3 [ ] Anxiety [ ] Delirium [ ] Agitation [ ] Calm   EYES: [x ] No scleral icterus [ ] Scleral icterus [ ] Closed  ENMT:  [ ]Dry mouth  [x ]No external oral lesions [ ] No external ear or nose lesions  CARDIOVASCULAR:  [ ]Regular [ ]Irregular [ x]Tachy [ ]Not Tachy  [ ]Cabrera [ ] Edema [ ] No edema  PULMONARY:  [ ]Tachypnea  [ ]Audible excessive secretions [x ] No labored breathing [ ] labored breathing  GASTROINTESTINAL: [ ]Soft  [ ]Distended  [x ]Not distended [ ]Non tender [ ]Tender  MUSCULOSKELETAL: [ ]No clubbing [ ] clubbing  [x ] No cyanosis [ ] cyanosis  NEUROLOGIC: [ ]No focal deficits  [ x]Follows commands  [ ]Does not follow commands  [ ]Cognitive impairment  [ ]Dysphagia  [ ]Dysarthria  [ ]Paresis   SKIN: [ ] Jaundiced [ ] Non-jaundiced [ ]Rash [ x]No Rash [ ] Warm [ ] Dry  MISC/LINES: [ ] ET tube [ ] Trach [ ]NGT/OGT [ ]PEG [ ]Pitts    CRITICAL CARE:  [ ] Shock Present  [ ]Septic [ ]Cardiogenic [ ]Neurologic [ ]Hypovolemic  [ ]  Vasopressors [ ]  Inotropes   [ x]Respiratory failure present [ ]Mechanical ventilation [x ]Non-invasive ventilatory support [ ]High flow  [ ]Acute  [ ]Chronic [ ]Hypoxic  [ ]Hypercarbic [ ]Other  [ ]Other organ failure     LABS: reviewed by me                        9.1    23.73 )-----------( 309      ( 20 Dec 2022 05:54 )             29.1   12-20    144  |  101  |  38<H>  ----------------------------<  118<H>  3.7   |  31  |  0.8    Ca    9.0      20 Dec 2022 05:54  Mg     2.2     12-20    TPro  5.2<L>  /  Alb  2.4<L>  /  TBili  0.6  /  DBili  x   /  AST  7   /  ALT  6   /  AlkPhos  94  12-20        RADIOLOGY & ADDITIONAL STUDIES: reviewed by me  < from: VA Duplex Lower Ext Vein Scan, Bilat (12.18.22 @ 20:16) >  IMPRESSION:  No evidence of deep venous thrombosis in either lower extremity.    < end of copied text >      EKG: reviewed by me  < from: 12 Lead ECG (12.17.22 @ 11:01) >  Ventricular Rate 122 BPM    Atrial Rate 308 BPM    QRS Duration 102 ms    Q-T Interval 290 ms    QTC Calculation(Bazett) 413 ms    R Axis 137 degrees    T Axis -41 degrees    Diagnosis Line Atrial flutter with variable A-V block  Left posterior fascicular block  ST & T wave abnormality, consider inferior ischemia  Abnormal ECG    < end of copied text >      PROTEIN CALORIE MALNUTRITION PRESENT: [ ]mild [ ]moderate [ ]severe [ ]underweight [ ]morbid obesity  https://www.andeal.org/vault/2440/web/files/ONC/Table_Clinical%20Characteristics%20to%20Document%20Malnutrition-White%20JV%20et%20al%202012.pdf      Weight (kg): 59.8 (12-19-22 @ 05:15)    [ ]PPSV2 < or = to 30% [ ]significant weight loss  [ ]poor nutritional intake  [ ]anasarca      [ ]Artificial Nutrition      REFERRALS:   [x ]Chaplaincy  [ ]Hospice  [ ]Child Life  [ x]Social Work  [ ]Case management [ ]Holistic Therapy     Goals of Care Document:

## 2022-12-20 NOTE — SWALLOW BEDSIDE ASSESSMENT ADULT - SWALLOW EVAL: RECOMMENDED DIET
puree diet w/ mildly thick liquids, allow sips of thin liquids at pts request
unable to make po diet reccs

## 2022-12-20 NOTE — CONSULT NOTE ADULT - PROBLEM SELECTOR RECOMMENDATION 2
Seen at [facility]. Was told no further chemo to be offered in Sept 2022.  -no additional disease directed therapy  -continue BIPAP for now  -plan for CMO tomorrow

## 2022-12-20 NOTE — CONSULT NOTE ADULT - CONVERSATION DETAILS
Spoke with family at bedside. Palliative care introduced. They had already spoken at length with the primary team about GOC and comfort measures only.  Discussed comfort measures only at length and noted that if plan is comfort measures only with BIPAP removal, patient will likely die in the hospital. All questions answered.    Patient and family would like to pursue comfort measures only tomorrow at 10am. All questions answered.

## 2022-12-20 NOTE — SWALLOW BEDSIDE ASSESSMENT ADULT - SWALLOW EVAL: DIAGNOSIS
+overt s/s of penetration/aspiration w/ thin, +toleration of mildly thick and puree, min po trials accepted
Pt on bipap, pt and family educated on dysphagia

## 2022-12-20 NOTE — CONSULT NOTE ADULT - ASSESSMENT
IMPRESSION:    Acute hypoxemic resp failure in NRM  PNA/ possible aspiration  COPD exacerbation  Metastatic lung cancer      PLAN:    - IV abx, dc azithro add flagyl  - solumedrol 40 q 12  - Nen as needed  - echo  - LE doppler  - HHFNC keep Sao2 92 to 96%  - speech and swallow eval  - poor prognosis  - GOC  - Palliative care eval    
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 Hospital course complicated by respiratory failure in the setting of pneumonia and metastatic lung cancer requiring BIPAP.  Palliative care consulted for GOC.    Patient and family wishes for comfort measures only with BIPAP removal tomorrow at 10am after all family visits. Will follow.      MEDD (morphine equivalent daily dose): NA      See Recs below.    Please call [phone] with questions or concerns 24/7.   We will continue to follow.     Discussed with primary MD.  
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 SpO2 in the 70's%. from NH.   He denies fever, chills.    IMPRESSION;   Possible bacterial PNA b/l superimposed on extensive underlying lung cancer.  WBC 20.312/16 BCx NG  COVID 19/ Influenza/ RSV NG.    RECOMMENDATIONS;  Urine for strep pneumonia/legionella antigen  Nares ORSA  Sputum gm stain, cultures.  Zosyn 3.375 gm iv q8h over 4h  Levoquin 500 mg iv q24h  Vancomycin 1 gm iv q12h ( hold if ORSA NG )  Off loading to prevent pressure sores and preventive measures to avoid aspiration

## 2022-12-20 NOTE — PROGRESS NOTE ADULT - TIME BILLING
time spent on review of labs, imaging studies, old records, obtaining history, personally examining patient, counselling and communicating with patient, entering orders for medications/tests/etc, discussions with other health care providers, documentation in electronic health records, independent interpretation of labs, imaging/procedure results and care coordination.
Counseled staff about diagnostic testing and treatment plan.

## 2022-12-20 NOTE — SWALLOW BEDSIDE ASSESSMENT ADULT - SWALLOW EVAL: PATIENT/FAMILY GOALS STATEMENT
Pt and family deny h/o dysphagia, was seen for clinical dysphagia eval when he was at [place] w/o recommendations for a modified po diet or instrumental swallow study per spouse and daughter.
Pt and sig other educated on comfort feeds and rationale

## 2022-12-20 NOTE — SWALLOW BEDSIDE ASSESSMENT ADULT - SLP PERTINENT HISTORY OF CURRENT PROBLEM
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 He was admitted for sepsis secondary to PNA.
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 He was admitted for sepsis secondary to PNA.

## 2022-12-20 NOTE — SWALLOW BEDSIDE ASSESSMENT ADULT - SLP GENERAL OBSERVATIONS
Pt received in bed awake and alert on bipap, family at b/s
Pt received in bed asleep, woke to verbal stim on O2 NC, repositioned in bed and then w/ increased respiratory effort SPO2 ~95% on 5L

## 2022-12-20 NOTE — SWALLOW BEDSIDE ASSESSMENT ADULT - SPECIFY REASON(S)
dysphagia eval
dysphagia f/u, determine comfort feed reccs as pt will be transitioned to CMO tomorrow after family is able to visit

## 2022-12-20 NOTE — CONSULT NOTE ADULT - PROBLEM SELECTOR RECOMMENDATION 3
In the setting of PNA, lung cancer, anemia  -continue BIPAP, antibiotics, steroids for now  -plan for CMO tomorrow with BIPAP removal

## 2022-12-20 NOTE — SWALLOW BEDSIDE ASSESSMENT ADULT - NS SPL SWALLOW CLINIC TRIAL FT
will f/u if pt able to be weaned off bipap today
+overt s/s of penetration/aspiration w/ thin, +toleration of mildly thick and puree, min po trials accepted

## 2022-12-21 NOTE — PROGRESS NOTE ADULT - CONVERSATION DETAILS
Saw patient at bedside with daughter. BIPAP was off and patient was on nasal cannula.  Yesterday, discussed initiating comfort measures only today.  Patient and daughter confirmed they wished to start comfort measures only.  Discussed that BIPAP would not be placed again, medications would be added for comfort, and if patient was stable for discharge in 24 hours, we'd place a hospice consult. All questions answered.
Pt has pneumonia and suspected CHF. Goals of Care Conversation done with pt. Discussed FULL CODE VS DNR/DNI. CPR and intubation discussed with Pt. Pt wishes to be DNR/DNI.  Pt AAOX3. All questions answered. BARBARA signed and placed in chart

## 2022-12-21 NOTE — HOSPICE CARE NOTE - CONVESATION DETAILS
Please update hospice intake office at [phone] once family has determined what facility the patient will be transferred to. At this time palliative care is managing the patient under CMO. Hospice available once patient is transferred to SNF.

## 2022-12-23 NOTE — CHART NOTE - NSCHARTNOTEFT_GEN_A_CORE
PALLIATIVE MEDICINE INTERDISCIPLINARY TEAM NOTE    Provider:  [   ]Social Work   [   ]          [ x  ] Initial visit [   ] Follow up  x  Family or contact name / phone #   Met with: [x   ] Patient  [   ] Family  [   ] Other:    Primary Language: [   ] English [   ] Other*:                      *Interpretation provided by:    SUPPORT DIAGNOSES            (Check all that apply)  [   ] Psychosocial spiritual assessment (PSSA)  [x   ] EOL issues  [   ] Cultural / spiritual concerns  [   ] Pain / suffering  [   ] Dementia / AMS  [   ] Other:  [   ] AD issues  [   ] Grief / loss / sadness  [   ] Discharge issues  [ x  ] Distress / coping    PSYCHOSOCIAL ASSESSMENT OF PATIENT         (Check all that apply)  [ x  ] Initial Assessment            [   ] Reassessment          [   ] Not Applicable this visit    Pain/suffering acuity:  patietn appeared to be comfortable  [   ] None to mild (0-3)           [   ] Moderate (4-6)        [   ] High (7-10)    Mental Status:  [   ] Alert/oriented (x3)          [   ] Confused/Altered(x2/x1)         [ x  ] Non-resp    Functional status:  [   ] Independent w ADLs      [   ] Needs Assistance             [  x ] Bedbound/Full Care    Coping:  unable to assess  [   ] Coping well                     [   ] Coping w/difficulty            [   ] Poor coping    Support system:  unable to assess  [   ] Strong                              [   ] Adequate                        [   ] Inadequate      Past history and medications for:     [ ] Anxiety       [ ] Depression    [ ] Sleep disorders     SPIRITUAL ASSESSMENT  Episcopalian/Spiritual practice: ___________________________    Role of organized Hoahaoism:  [   ] Important                     [   ] Some (fam tradition, cultural)               [   ] None    Effects on medical care:  [   ] Yes, _____________________________________                         [   ] None    Cultural/Gnosticism need:  [   ] Yes, _____________________________________                         [   ] None    Refer to Pastoral Care:  [   ] Yes           [   ] No, not at this time    SERVICE PROVIDED  [   ]PSSA                                                                             [   ]Discharge support / facilitation  [   ]AD / goals of care counseling                                  [   ]EOL / death / bereavement counseling  [   ]Counseling / support                                                [   ] Family meeting  [   ]Prayer / sacrament / ritual                                      [   ] Referral   [   ]Other                                                                       NOTE and Plan of Care (PoC):    Patient is an 82 year old male.  Patient was admitted on 12/15/22, dx:  CHF exacerbation, elevated WBCS.    Patient was observed to be resting comfortably.  Patient is CMO.

## 2022-12-23 NOTE — DISCHARGE NOTE PROVIDER - CARE PROVIDER_API CALL
Patel Luciano  Internal Medicine  [address] [place] [id number]  Phone: [phone]  Fax: [phone]  Established Patient  Follow Up Time:

## 2022-12-23 NOTE — PROGRESS NOTE ADULT - PROBLEM SELECTOR PLAN 1
-DNR/DNI  -Comfort measures only  -MOLST filled out and placed in chart  -No additional IVF, artificial nutrition, blood draws, vital signs, pressors, antibiotics, escalation of oxygen, escalation of care  -Comfort feeds OK  -Home meds OK  -Hospice consult
-DNR/DNI  -Comfort measures only  -MOLST filled out and placed in chart  -No additional IVF, artificial nutrition, blood draws, vital signs, pressors, antibiotics, escalation of oxygen, escalation of care  -Comfort feeds OK  -Home meds OK  -Hospice consult - OK for disposition from a palliative care perspective
-DNR/DNI  -Comfort measures only  -MOLST filled out and placed in chart  -No additional IVF, artificial nutrition, blood draws, vital signs, pressors, antibiotics, escalation of oxygen, escalation of care  -Comfort feeds OK  -Home meds OK  -Hospice consult if patient stable off BIPAP after 24 hours

## 2022-12-23 NOTE — DISCHARGE NOTE PROVIDER - CARE PROVIDERS DIRECT ADDRESSES
Pt is normally seen at [facility] for Supra pubic cath changes but tonight he is having blood around the cath and his urine is really bloody. [email or web address]

## 2022-12-23 NOTE — PROGRESS NOTE ADULT - PROBLEM SELECTOR PLAN 5
-ativan 0.5mg IV q4h PRN for anxiety/agitation
-ativan 0.5mg IV q4h PRN for anxiety/agitation    For discharge:  -ativan concentrate SL 1mg q4h PRN for anxiety/agitation
-ativan 0.5mg IV q4h PRN for anxiety/agitation

## 2022-12-23 NOTE — PROGRESS NOTE ADULT - PROBLEM SELECTOR PLAN 3
In the setting of PNA, lung cancer, anemia  -Comfort measures only  -can continue nasal cannula O2, but no additional escalation of oxygen  -no additional BIPAP  -No additional antibiotics  -can continue lasix as tolerated

## 2022-12-23 NOTE — PROGRESS NOTE ADULT - PROBLEM SELECTOR PLAN 4
-morphine 2mg IV q1h PRN for pain/dyspnea  -glycopyrrolate 0.2mg IV q6h PRN for secretions    For discharge:  -morphine concentrate 5mg SL q2h PRN for pain/dyspnea

## 2022-12-23 NOTE — DISCHARGE NOTE PROVIDER - HOSPITAL COURSE
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   Patient reports that patient has been having worsening SOB associated with non-productive cough for the past week. Today, he was found to have SpO2 in the 70's% which prompted him to be brought to the ED from NH. He was given Duoneb and Decadron by EMS. Initially, patient was placed on BiPAP saturating 99% then to oxygen mask.    He denies fever, chills, rhinorrhea, chest pain. No abdominal or urinary symptoms.     In ED, vitals significant for  RR 38 SpO2 93% on 6L oxygen mask   Laboratory workup significant for WBC 23.81 with 88.7% neutrophils Hb 8.8  ABG's pH 7.34 pCO2 46 pO2 24 HCO3 25 Lactate 4.9  RVP and COVID negative.   Troponin 0.02 pro BNP 3665  Chest X-ray: congestion and bilateral opacities (unofficial read)  Admitted for further management and monitoring to Telemetry.   patient was treated fro gram negative pna and covid pna. palliative on board -> CMO

## 2022-12-23 NOTE — PROGRESS NOTE ADULT - PROBLEM/PLAN-2
70 year old with COVID pneumonia 8/12 s/p tocilizumab, remdesivir and two courses of decadron.     Cardiac arrest 8/20.     Prolonged fevers since 9/8 despite treating potential secondary infections.     Staph epidermidis on blood cultures, intermittently since end of Aug.  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 No central lines and I don't think it's from his sacral wound. Has been on Vancomycin for about 5 weeks at this point but latest cultures 9/29 still with GPC.     VAP - Klebsiella and Enterobacter 9/11 with subsequent negative sputum.     Proctitis - CT 9/23- completed course of unasyn    Now afebrile for the past few days.     Poor long term prognosis. PEG 9/30. Trach 10/1.     Suggest  -repeat t blood cultures without growth  -repeat TTE if SEJAL is not planned   - Daptomycin 700mg IV q24h to see if it has any effect on blood cultures  -weekly CPK while on Daptomycin     -supportive care   -goals of care per primary team and palliative     
DISPLAY PLAN FREE TEXT

## 2022-12-23 NOTE — DISCHARGE NOTE PROVIDER - NSDCCPCAREPLAN_GEN_ALL_CORE_FT
PRINCIPAL DISCHARGE DIAGNOSIS  Diagnosis: Gram-negative pneumonia  Assessment and Plan of Treatment: you presented for shortness of beath. you were found to have covid pneumonia with bacterial pneumonia. you were received treatment for both. palliative were consulted and you became comfort measures only. you will be discharged to a hospice facility      SECONDARY DISCHARGE DIAGNOSES  Diagnosis: Pneumonia due to COVID-19 virus  Assessment and Plan of Treatment:

## 2022-12-23 NOTE — DISCHARGE NOTE PROVIDER - NSDCMRMEDTOKEN_GEN_ALL_CORE_FT
albuterol 0.63 mg/3 mL (0.021%) inhalation solution: 3 milliliter(s) inhaled every 6 hours  amLODIPine 10 mg oral tablet: 1 tab(s) orally once a day  Bonifacio-Gest 500 mg oral tablet, chewable: 1 tab(s) orally once a day  Daily Qasim oral tablet: 1 tab(s) orally once a day  Eliquis 5 mg oral tablet: 1 tab(s) orally 2 times a day  famotidine 20 mg oral tablet: 1 tab(s) orally once a day  furosemide 20 mg oral tablet: 1 tab(s) orally once a day  latanoprost 0.005% ophthalmic solution: 1 drop(s) to each affected eye once a day (in the evening)  Metoprolol Tartrate 25 mg oral tablet: 1 tab(s) orally every 6 hours  MiraLax oral powder for reconstitution: 1 packet(s) orally once a day  ondansetron 8 mg oral tablet: 1 tab(s) orally 3 times a day  oseltamivir 75 mg oral capsule: 1 cap(s) orally once a day  potassium chloride 20 mEq oral tablet, extended release: 1 tab(s) orally once a day

## 2022-12-23 NOTE — PROGRESS NOTE ADULT - PROBLEM SELECTOR PLAN 2
Seen at [facility]. Was told no further chemo to be offered in Sept 2022.  -no additional disease directed therapy  -CMO
Seen at [facility]. Was told no further chemo to be offered in Sept 2022.  -no additional disease directed therapy  -continue BIPAP for now  -plan for CMO tomorrow.
Seen at [facility]. Was told no further chemo to be offered in Sept 2022.  -no additional disease directed therapy  -CMO

## 2022-12-24 NOTE — PROGRESS NOTE ADULT - SUBJECTIVE AND OBJECTIVE BOX
CHIEF COMPLAINT:    Patient is a 82y old  Male who presents with a chief complaint of SOB     INTERVAL HPI/OVERNIGHT EVENTS:    Patient seen and examined at bedside with his partner and HCP  No acute overnight events  unable to remove bipap this am as pt's HR increases significantly  spoke to pt's family in great detail this am regarding comfort care - agreeable to start CMO tomorrow once all family members visit - discussed with palliative care attending - they will see the patient today     ROS: Denies SOB, chest pain or pain at this time. All other systems are negative.      Vital Signs:  Vital Signs Last 24 Hrs  T(C): 36.1 (20 Dec 2022 05:21), Max: 36.1 (20 Dec 2022 05:21)  T(F): 97 (20 Dec 2022 05:21), Max: 97 (20 Dec 2022 05:21)  HR: 144 (20 Dec 2022 12:13) (80 - 144)  BP: 113/69 (20 Dec 2022 12:13) (98/59 - 136/82)  BP(mean): 86 (20 Dec 2022 12:13) (86 - 90)  RR: 20 (20 Dec 2022 10:09) (18 - 22)  SpO2: 97% (20 Dec 2022 11:45) (92% - 98%)    Parameters below as of 20 Dec 2022 10:09  Patient On (Oxygen Delivery Method): BiPAP/CPAP        PHYSICAL EXAM:  GENERAL:  NAD, on bipap  SKIN: No rashes or lesions  HEENT: Atraumatic. Normocephalic. Anicteric  NECK:  No JVD.   PULMONARY: decreased bs at bases  CVS: Normal S1, S2. Regular rate and rhythm. No murmurs.  ABDOMEN/GI: Soft, Nontender, Nondistended; Bowel sounds are present  EXTREMITIES:  +1 b/l LE edema   NEUROLOGIC:  No motor deficit.  PSYCH: Alert & oriented x 3, normal affect      LABS:                                   9.1    23.73 )-----------( 309      ( 20 Dec 2022 05:54 )             29.1       12-20    144  |  101  |  38<H>  ----------------------------<  118<H>  3.7   |  31  |  0.8    Ca    9.0      20 Dec 2022 05:54  Mg     2.2     12-20    TPro  5.2<L>  /  Alb  2.4<L>  /  TBili  0.6  /  DBili  x   /  AST  7   /  ALT  6   /  AlkPhos  94  12-20        RADIOLOGY & ADDITIONAL TESTS:  Imaging or report Personally Reviewed:  [ ] YES  [ ] NO -->no new images    Telemetry reviewed independently - NSR, no acute events  EKG reviewed independently -->no new EKGs    Consultant(s) Notes Reviewed:  [ ] YES  [ ] NO  Care Discussed with Consultants/Other Providers [ ] YES  [ ] NO      MEDICATIONS  (STANDING):  albuterol/ipratropium for Nebulization 3 milliLiter(s) Nebulizer every 6 hours  calcium carbonate    500 mG (Tums) Chewable 1 Tablet(s) Chew daily  chlorhexidine 2% Cloths 1 Application(s) Topical <User Schedule>  enoxaparin Injectable 60 milliGRAM(s) SubCutaneous every 12 hours  famotidine    Tablet 20 milliGRAM(s) Oral daily  furosemide   Injectable 40 milliGRAM(s) IV Push every 12 hours  latanoprost 0.005% Ophthalmic Solution 1 Drop(s) Both EYES at bedtime  levoFLOXacin IVPB 500 milliGRAM(s) IV Intermittent every 24 hours  levoFLOXacin IVPB      methylPREDNISolone sodium succinate Injectable 40 milliGRAM(s) IV Push two times a day  metoprolol tartrate Injectable 5 milliGRAM(s) IV Push every 6 hours  multivitamin 1 Tablet(s) Oral daily  piperacillin/tazobactam IVPB.. 3.375 Gram(s) IV Intermittent every 8 hours    MEDICATIONS  (PRN):  morphine  - Injectable 0.5 milliGRAM(s) IV Push every 2 hours PRN Moderate Pain (4 - 6)    
CHIEF COMPLAINT:    Patient is a 82y old  Male who presents with a chief complaint of SOB     INTERVAL HPI/OVERNIGHT EVENTS:    Patient seen and examined at bedside. No acute overnight events occurred.    ROS: Denies SOB, chest pain. Reports diffuse body pain. All other systems are negative.    Medications:  Standing  albuterol/ipratropium for Nebulization 3 milliLiter(s) Nebulizer every 6 hours  calcium carbonate    500 mG (Tums) Chewable 1 Tablet(s) Chew daily  cefepime   IVPB      cefepime   IVPB 2000 milliGRAM(s) IV Intermittent every 8 hours  enoxaparin Injectable 60 milliGRAM(s) SubCutaneous every 12 hours  famotidine    Tablet 20 milliGRAM(s) Oral daily  furosemide   Injectable 40 milliGRAM(s) IV Push every 12 hours  latanoprost 0.005% Ophthalmic Solution 1 Drop(s) Both EYES at bedtime  methylPREDNISolone sodium succinate Injectable 40 milliGRAM(s) IV Push two times a day  metoprolol tartrate Injectable 5 milliGRAM(s) IV Push every 6 hours  metroNIDAZOLE  IVPB 500 milliGRAM(s) IV Intermittent every 8 hours  multivitamin 1 Tablet(s) Oral daily  potassium chloride  20 mEq/100 mL IVPB 20 milliEquivalent(s) IV Intermittent every 2 hours    PRN Meds  morphine  - Injectable 0.5 milliGRAM(s) IV Push every 2 hours PRN        Vital Signs:    T(F): 96.6 (22 @ 11:56), Max: 96.6 (22 @ 21:04)  HR: 113 (22 @ 11:56) (74 - 113)  BP: 99/69 (22 @ 11:56) (99/69 - 112/77)  RR: 20 (22 @ 11:56) (18 - 20)  SpO2: 91% (22 @ 11:56) (91% - 95%)  I&O's Summary    18 Dec 2022 07:01  -  19 Dec 2022 07:00  --------------------------------------------------------  IN: 460 mL / OUT: 1250 mL / NET: -790 mL      Daily     Daily Weight in k.8 (19 Dec 2022 05:15)  CAPILLARY BLOOD GLUCOSE          PHYSICAL EXAM:  GENERAL:  NAD  SKIN: No rashes or lesions  HEENT: Atraumatic. Normocephalic. Anicteric  NECK:  No JVD.   PULMONARY: Clear to ausculation bilaterally. No wheezing. No rales  CVS: Normal S1, S2. Regular rate and rhythm. No murmurs.  ABDOMEN/GI: Soft, Nontender, Nondistended; Bowel sounds are present  EXTREMITIES:  No edema B/L LE.  NEUROLOGIC:  No motor deficit.  PSYCH: Alert & oriented x 3, normal affect      LABS:                        9.5    16.03 )-----------( 303      ( 19 Dec 2022 05:47 )             29.1         142  |  100  |  27<H>  ----------------------------<  143<H>  3.2<L>   |  33<H>  |  0.7    Ca    8.8      19 Dec 2022 05:47  Mg     2.1         TPro  5.2<L>  /  Alb  2.8<L>  /  TBili  0.5  /  DBili  x   /  AST  7   /  ALT  8   /  AlkPhos  102        Serum Pro-Brain Natriuretic Peptide: 3665 pg/mL (12-15-22 @ 19:03)          RADIOLOGY & ADDITIONAL TESTS:  Imaging or report Personally Reviewed:  [ ] YES  [ ] NO -->no new images    Telemetry reviewed independently - NSR, no acute events  EKG reviewed independently -->no new EKGs    Consultant(s) Notes Reviewed:  [ ] YES  [ ] NO  Care Discussed with Consultants/Other Providers [ ] YES  [ ] NO    Case discussed with resident  Care discussed with pt        
TARIQ JUSTIN  82y, Male  Allergy: Allergy Status Unknown    Hospital Day: 9d    Patient seen and examined earlier today.  No acute events overnight.  resting comfortably. denies any pain. reports some SOB.    ROS: negative except as noted above    PMH/PSH:  PAST MEDICAL & SURGICAL HISTORY:      LAST 24-Hr EVENTS:    VITALS:  T(F): 96.6 (12-23-22 @ 16:32), Max: 96.6 (12-23-22 @ 16:32)  HR: 88 (12-23-22 @ 16:32)  BP: 96/66 (12-23-22 @ 16:32) (96/66 - 96/66)  RR: 20 (12-23-22 @ 16:32)  SpO2: 94% (12-23-22 @ 16:32)          TESTS & MEASUREMENTS:  Weight/BMI  53 (12-23-22 @ 01:22)    12-22-22 @ 07:01  -  12-23-22 @ 07:00  --------------------------------------------------------  IN: 240 mL / OUT: 700 mL / NET: -460 mL    12-23-22 @ 07:01  -  12-24-22 @ 07:00  --------------------------------------------------------  IN: 0 mL / OUT: 500 mL / NET: -500 mL                            Procalcitonin, Serum: 0.31 ng/mL (12-16-22 @ 08:44)    D-Dimer Assay, Quantitative: 397 ng/mL DDU (12-17-22 @ 11:34)      Serum Pro-Brain Natriuretic Peptide: 3665 pg/mL (12-15-22 @ 19:03)    COVID-19 PCR: Detected (12-20-22 @ 07:42)        A1C with Estimated Average Glucose Result: 5.3 % (12-16-22 @ 08:44)      Indwelling Urethral Catheter:     Connect To:  Straight Drainage/Embarrass    Indication:  Urinary Retention / Obstruction (12-20-22 @ 06:16) (not performed)  Indwelling Urethral Catheter:     Connect To:  Straight Drainage/Embarrass    Indication:  Urinary Retention / Obstruction (12-18-22 @ 06:53) (not performed)      RADIOLOGY, ECG, & ADDITIONAL TESTS:  12 Lead ECG:   Ventricular Rate 122 BPM    Atrial Rate 308 BPM    QRS Duration 102 ms    Q-T Interval 290 ms    QTC Calculation(Bazett) 413 ms    R Axis 137 degrees    T Axis -41 degrees    Diagnosis Line Atrial flutter with variable A-V block  Left posterior fascicular block  ST & T wave abnormality, consider inferior ischemia  Abnormal ECG    Confirmed by Aleksandar Shepard ([id number]) on 12/19/2022 12:34:22 AM (12-17-22 @ 11:01)    CT Chest No Cont:   ACC: [id number] EXAM:  CT CHEST                          PROCEDURE DATE:  12/17/2022          INTERPRETATION:  STUDY INDICATION: worsening cxr    Technique: CT of the chest was performed without intravenous contrast.   Multiplanar reformatted images provided. MIPS provided.    Comparison: Chest CT  None.    Findings:    Mediastinum/Lymph Nodes: Multiple mildly enlarged mediastinal and likely   hilar lymph nodes.    Heart/Great Vessels: Visually the heart is without significant   enlargement. No pericardial effusion.    Ectatic ascending aorta measuring approximately 4.2 cm at the level of   the main pulmonary artery.    Abdomen: Limited evaluation based on technique. No acute inflammatory   changes. Left renal cyst.    Lungs, Pleura, and Airways:Emphysema. Bilateral small-to-moderate pleural   effusions. Bilateral dependent dense consolidations with obliteration of   interspersed airways. Additional bilateral multilobar and dependent   groundglass and nodular opacities. Approximately 3 to4 cm central   cavitary lesion in the right lower lobe contiguous with the airways   interspersed within the consolidation and limited in evaluation..    Bones and soft tissues: Approximately 3 cm lytic lesion along the left   scapular angle. Osteopenia. Degenerative changes of the spine    IMPRESSION:    Bilateral small-to-moderate pleural effusions. Bilateral dependent dense   consolidations with obliteration of interspersed airways. Additional   bilateral multilobar and dependent groundglass and nodular opacities.   Findings most likely reflecting a combination of pneumonia and   atelectasis.    Approximately 3 to 4 cm central cavitary lesion in the right lower lobe   contiguous with the airways interspersed within the consolidation and   limited in evaluation.    Underlying lung mass is likely. No comparison images are available.   Short-term follow-up CT and correlation with oncologic history is   recommended.    3 cm lytic lesion of the left scapular angle is likely metastatic.    Mediastinal lymphadenopathy.    --- End of Report ---            BENJAMIN BELTRÁN MD; Attending Radiologist  This document has been electronically signed. Dec 17 2022  1:05PM (12-17-22 @ 12:48)    RECENT DIAGNOSTIC ORDERS:      MEDICATIONS:  MEDICATIONS  (STANDING):  calcium carbonate    500 mG (Tums) Chewable 1 Tablet(s) Chew daily  chlorhexidine 2% Cloths 1 Application(s) Topical <User Schedule>  furosemide    Tablet 40 milliGRAM(s) Oral every 12 hours  latanoprost 0.005% Ophthalmic Solution 1 Drop(s) Both EYES at bedtime  metoprolol tartrate 25 milliGRAM(s) Oral every 6 hours  polyethylene glycol 3350 17 Gram(s) Oral daily    MEDICATIONS  (PRN):  albuterol    90 MICROgram(s) HFA Inhaler 2 Puff(s) Inhalation every 6 hours PRN Shortness of Breath and/or Wheezing  glycopyrrolate Injectable 0.2 milliGRAM(s) IV Push every 6 hours PRN Secretions  LORazepam   Injectable 0.5 milliGRAM(s) IV Push every 4 hours PRN Agitation or anxiety  morphine  - Injectable 2 milliGRAM(s) IV Push every 1 hour PRN Pain or dyspnea      HOME MEDICATIONS:  albuterol 0.63 mg/3 mL (0.021%) inhalation solution (12-16)  amLODIPine 10 mg oral tablet (12-16)  Bonifacio-Gest 500 mg oral tablet, chewable (12-16)  Daily Qasim oral tablet (12-16)  Eliquis 5 mg oral tablet (12-16)  famotidine 20 mg oral tablet (12-16)  furosemide 20 mg oral tablet (12-16)  latanoprost 0.005% ophthalmic solution (12-16)  Metoprolol Tartrate 25 mg oral tablet (12-16)  MiraLax oral powder for reconstitution (12-16)  ondansetron 8 mg oral tablet (12-16)  oseltamivir 75 mg oral capsule (12-16)  potassium chloride 20 mEq oral tablet, extended release (12-16)      PHYSICAL EXAM:  GENERAL: ill-looking, mild distress, catechetic.   HEAD:  Atraumatic, Normocephalic.  EYES: EOMI, PERRLA, conjunctiva and sclera clear.  NECK: Supple, No JVD.  CHEST/LUNG: bilateral lower lung rales.   HEART: Irregular rate and rhythm; S1 S2.   ABDOMEN: Soft, Nontender, Nondistended; Bowel sounds present.  EXTREMITIES:  2+ Peripheral Pulses, No clubbing, cyanosis, or edema.  PSYCH: AAOx3.  NEUROLOGY: non-focal.  SKIN: No rashes or lesions.        
TARIQ JUSTIN 82y Male  MRN#: [id number]   CODE STATUS:________    Hospital Day: 7d    Pt is currently admitted with the primary diagnosis of     SUBJECTIVE  Hospital Course  Patient is currently CMO pending hospice    Overnight events   none    Subjective complaints   constipation      Present Today:   - Pitts:  No [  ], Yes [   ] : Indication:     - Type of IV Access:       .. CVC/Piccline:  No [  ], Yes [   ] : Indication:       .. Midline: No [  ], Yes [   ] : Indication:                                              OBJECTIVE  PAST MEDICAL & SURGICAL HISTORY                                              ALLERGIES:  Allergy Status Unknown                           HOME MEDICATIONS  Home Medications:  albuterol 0.63 mg/3 mL (0.021%) inhalation solution: 3 milliliter(s) inhaled every 6 hours (16 Dec 2022 01:56)  amLODIPine 10 mg oral tablet: 1 tab(s) orally once a day (16 Dec 2022 01:56)  Bonifacio-Gest 500 mg oral tablet, chewable: 1 tab(s) orally once a day (16 Dec 2022 01:56)  Daily Qasim oral tablet: 1 tab(s) orally once a day (16 Dec 2022 01:56)  Eliquis 5 mg oral tablet: 1 tab(s) orally 2 times a day (16 Dec 2022 01:56)  famotidine 20 mg oral tablet: 1 tab(s) orally once a day (16 Dec 2022 01:56)  furosemide 20 mg oral tablet: 1 tab(s) orally once a day (16 Dec 2022 01:56)  latanoprost 0.005% ophthalmic solution: 1 drop(s) to each affected eye once a day (in the evening) (16 Dec 2022 01:56)  Metoprolol Tartrate 25 mg oral tablet: 1 tab(s) orally every 6 hours (16 Dec 2022 01:56)  MiraLax oral powder for reconstitution: 1 packet(s) orally once a day (16 Dec 2022 01:56)  ondansetron 8 mg oral tablet: 1 tab(s) orally 3 times a day (16 Dec 2022 01:56)  oseltamivir 75 mg oral capsule: 1 cap(s) orally once a day (16 Dec 2022 01:56)  potassium chloride 20 mEq oral tablet, extended release: 1 tab(s) orally once a day (16 Dec 2022 01:56)                           MEDICATIONS:  STANDING MEDICATIONS  calcium carbonate    500 mG (Tums) Chewable 1 Tablet(s) Chew daily  chlorhexidine 2% Cloths 1 Application(s) Topical <User Schedule>  furosemide    Tablet 40 milliGRAM(s) Oral every 12 hours  latanoprost 0.005% Ophthalmic Solution 1 Drop(s) Both EYES at bedtime  metoprolol tartrate 25 milliGRAM(s) Oral every 6 hours  polyethylene glycol 3350 17 Gram(s) Oral daily    PRN MEDICATIONS  albuterol    90 MICROgram(s) HFA Inhaler 2 Puff(s) Inhalation every 6 hours PRN  glycopyrrolate Injectable 0.2 milliGRAM(s) IV Push every 6 hours PRN  LORazepam   Injectable 0.5 milliGRAM(s) IV Push every 4 hours PRN  morphine  - Injectable 2 milliGRAM(s) IV Push every 1 hour PRN                                            ------------------------------------------------------------  VITAL SIGNS: Last 24 Hours  T(C): --  T(F): --  HR: --  BP: --  BP(mean): --  RR: --  SpO2: --      12-21-22 @ 07:01  -  12-22-22 @ 07:00  --------------------------------------------------------  IN: 330 mL / OUT: 850 mL / NET: -520 mL    12-22-22 @ 07:01  -  12-22-22 @ 16:05  --------------------------------------------------------  IN: 240 mL / OUT: 200 mL / NET: 40 mL                                               LABS:                                                                    RADIOLOGY:        PHYSICAL EXAM:  Alert, not oriented, not in respiratory distress  Soft nontender abdomen  heart RRR   Lungs: Crackles  No LE Edema                                         ASSESSMENT & PLAN    patient is CMO w/ continued home meds and lasix. Will arrange for transport to private room and off BiPAP. Palliative care following. Possible hospice tomorrow depending on clinical status    83 yo M PMHx chronic a. 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 He was admitted for sepsis secondary to PNA.     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  - moderate effusion and pneumonia   - pulmonary following - 4 hours of bipap on then 4 hours off with HFNC -> now off bipap based on GOC  - off IV abx and IV steroids   - c/w Lasix    #Chronic A.fib - not on ac  #HTN - on norvasc  - c/w Metoprolol   
  TARIQ JUSTIN  82y  Male      Patient is a 82y old  Male who presents with a chief complaint of SOB (21 Dec 2022 15:45)      INTERVAL HPI/OVERNIGHT EVENTS:  Patient feels ok, no new complaints.   Vital Signs Last 24 Hrs  T(C): 36.1 (21 Dec 2022 05:28), Max: 36.3 (20 Dec 2022 20:53)  T(F): 97 (21 Dec 2022 05:28), Max: 97.3 (20 Dec 2022 20:53)  HR: 106 (21 Dec 2022 05:50) (45 - 152)  BP: 108/63 (21 Dec 2022 05:28) (104/55 - 122/79)  BP(mean): 77 (21 Dec 2022 05:28) (73 - 93)  RR: 20 (21 Dec 2022 05:28) (18 - 20)  SpO2: 99% (21 Dec 2022 05:28) (97% - 99%)    Parameters below as of 21 Dec 2022 05:28  Patient On (Oxygen Delivery Method): BiPAP/CPAP          12-20-22 @ 07:01  -  12-21-22 @ 07:00  --------------------------------------------------------  IN: 0 mL / OUT: 400 mL / NET: -400 mL    12-21-22 @ 07:01  -  12-21-22 @ 16:40  --------------------------------------------------------  IN: 330 mL / OUT: 550 mL / NET: -220 mL            Consultant(s) Notes Reviewed:  [x ] YES  [ ] NO          MEDICATIONS  (STANDING):  albuterol/ipratropium for Nebulization 3 milliLiter(s) Nebulizer every 6 hours  calcium carbonate    500 mG (Tums) Chewable 1 Tablet(s) Chew daily  chlorhexidine 2% Cloths 1 Application(s) Topical <User Schedule>  furosemide    Tablet 40 milliGRAM(s) Oral every 12 hours  latanoprost 0.005% Ophthalmic Solution 1 Drop(s) Both EYES at bedtime  metoprolol tartrate 25 milliGRAM(s) Oral every 6 hours    MEDICATIONS  (PRN):  glycopyrrolate Injectable 0.2 milliGRAM(s) IV Push every 6 hours PRN Secretions  LORazepam   Injectable 0.5 milliGRAM(s) IV Push every 4 hours PRN Agitation or anxiety  morphine  - Injectable 2 milliGRAM(s) IV Push every 1 hour PRN Pain or dyspnea      LABS                          9.1    23.73 )-----------( 309      ( 20 Dec 2022 05:54 )             29.1     12-20    144  |  101  |  38<H>  ----------------------------<  118<H>  3.7   |  31  |  0.8    Ca    9.0      20 Dec 2022 05:54  Mg     2.2     12-20    TPro  5.2<L>  /  Alb  2.4<L>  /  TBili  0.6  /  DBili  x   /  AST  7   /  ALT  6   /  AlkPhos  94  12-20          Lactate Trend        CAPILLARY BLOOD GLUCOSE          Culture - Blood (collected 12-16-22 @ 08:44)  Source: .Blood None  Preliminary Report (12-17-22 @ 18:01):    No growth to date.        RADIOLOGY & ADDITIONAL TESTS:    Imaging Personally Reviewed:  [ ] YES  [ ] NO    HEALTH ISSUES - PROBLEM Dx:  Palliative care by specialist    Lung cancer    Acute respiratory failure with hypoxia    Dyspnea    Anxiety            PHYSICAL EXAM:  GENERAL: ill-looking, mild distress, catechetic.   HEAD:  Atraumatic, Normocephalic.  EYES: EOMI, PERRLA, conjunctiva and sclera clear.  NECK: Supple, No JVD.  CHEST/LUNG: bilateral lower lung rales.   HEART: Irregular rate and rhythm; S1 S2.   ABDOMEN: Soft, Nontender, Nondistended; Bowel sounds present.  EXTREMITIES:  2+ Peripheral Pulses, No clubbing, cyanosis, or edema.  PSYCH: AAOx3.  NEUROLOGY: non-focal.  SKIN: No rashes or lesions.
TARIQ JUSTIN 82y Male  MRN#: [id number]   CODE STATUS:________    Hospital Day: 6d    Pt is currently admitted with the primary diagnosis of     SUBJECTIVE  Hospital Course    Overnight events   none    Subjective complaints   wants to take off bipap    Present Today:   - Pitts:  No [  ], Yes [   ] : Indication:     - Type of IV Access:       .. CVC/Piccline:  No [  ], Yes [   ] : Indication:       .. Midline: No [  ], Yes [   ] : Indication:                                              OBJECTIVE  PAST MEDICAL & SURGICAL HISTORY                                              ALLERGIES:  Allergy Status Unknown                           HOME MEDICATIONS  Home Medications:  albuterol 0.63 mg/3 mL (0.021%) inhalation solution: 3 milliliter(s) inhaled every 6 hours (16 Dec 2022 01:56)  amLODIPine 10 mg oral tablet: 1 tab(s) orally once a day (16 Dec 2022 01:56)  Bonifacio-Gest 500 mg oral tablet, chewable: 1 tab(s) orally once a day (16 Dec 2022 01:56)  Daily Qasim oral tablet: 1 tab(s) orally once a day (16 Dec 2022 01:56)  Eliquis 5 mg oral tablet: 1 tab(s) orally 2 times a day (16 Dec 2022 01:56)  famotidine 20 mg oral tablet: 1 tab(s) orally once a day (16 Dec 2022 01:56)  furosemide 20 mg oral tablet: 1 tab(s) orally once a day (16 Dec 2022 01:56)  latanoprost 0.005% ophthalmic solution: 1 drop(s) to each affected eye once a day (in the evening) (16 Dec 2022 01:56)  Metoprolol Tartrate 25 mg oral tablet: 1 tab(s) orally every 6 hours (16 Dec 2022 01:56)  MiraLax oral powder for reconstitution: 1 packet(s) orally once a day (16 Dec 2022 01:56)  ondansetron 8 mg oral tablet: 1 tab(s) orally 3 times a day (16 Dec 2022 01:56)  oseltamivir 75 mg oral capsule: 1 cap(s) orally once a day (16 Dec 2022 01:56)  potassium chloride 20 mEq oral tablet, extended release: 1 tab(s) orally once a day (16 Dec 2022 01:56)                           MEDICATIONS:  STANDING MEDICATIONS  albuterol/ipratropium for Nebulization 3 milliLiter(s) Nebulizer every 6 hours  calcium carbonate    500 mG (Tums) Chewable 1 Tablet(s) Chew daily  chlorhexidine 2% Cloths 1 Application(s) Topical <User Schedule>  furosemide    Tablet 40 milliGRAM(s) Oral every 12 hours  latanoprost 0.005% Ophthalmic Solution 1 Drop(s) Both EYES at bedtime  metoprolol tartrate 25 milliGRAM(s) Oral every 6 hours    PRN MEDICATIONS  glycopyrrolate Injectable 0.2 milliGRAM(s) IV Push every 6 hours PRN  LORazepam   Injectable 0.5 milliGRAM(s) IV Push every 4 hours PRN  morphine  - Injectable 2 milliGRAM(s) IV Push every 1 hour PRN                                            ------------------------------------------------------------  VITAL SIGNS: Last 24 Hours  T(C): 36.1 (21 Dec 2022 05:28), Max: 36.3 (20 Dec 2022 20:53)  T(F): 97 (21 Dec 2022 05:28), Max: 97.3 (20 Dec 2022 20:53)  HR: 106 (21 Dec 2022 05:50) (45 - 152)  BP: 108/63 (21 Dec 2022 05:28) (104/55 - 122/79)  BP(mean): 77 (21 Dec 2022 05:28) (73 - 93)  RR: 20 (21 Dec 2022 05:28) (18 - 20)  SpO2: 99% (21 Dec 2022 05:28) (97% - 99%)      12-20-22 @ 07:01  -  12-21-22 @ 07:00  --------------------------------------------------------  IN: 0 mL / OUT: 400 mL / NET: -400 mL    12-21-22 @ 07:01  -  12-21-22 @ 15:45  --------------------------------------------------------  IN: 330 mL / OUT: 550 mL / NET: -220 mL                                               LABS:                        9.1    23.73 )-----------( 309      ( 20 Dec 2022 05:54 )             29.1     12-20    144  |  101  |  38<H>  ----------------------------<  118<H>  3.7   |  31  |  0.8    Ca    9.0      20 Dec 2022 05:54  Mg     2.2     12-20    TPro  5.2<L>  /  Alb  2.4<L>  /  TBili  0.6  /  DBili  x   /  AST  7   /  ALT  6   /  AlkPhos  94  12-20                                                              RADIOLOGY:        PHYSICAL EXAM:  Alert, not oriented, not in respiratory distress  Soft nontender abdomen  heart RRR   Lungs: Crackles  No LE Edema                                         ASSESSMENT & PLAN    patient is CMO w/ continued home meds and lasix. Will arrange for transport to private room and off BiPAP. Palliative care following. Possible hospice tomorrow depending on clinical status    81 yo M PMHx chronic a. 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 He was admitted for sepsis secondary to PNA.     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  - moderate effusion and pneumonia   - pulmonary following - 4 hours of bipap on then 4 hours off with HFNC -> now off bipap based on GOC  - off IV abx and IV steroids   - c/w Lasix    #Chronic A.fib - not on ac  #HTN - on norvasc  - c/w Metoprolol     
  TARIQ JUSTIN  82y  Male      Patient is a 82y old  Male who presents with a chief complaint of SOB (21 Dec 2022 16:35)      INTERVAL HPI/OVERNIGHT EVENTS:  He has mild SOB, not eating well, feels comfortable.   Vital Signs Last 24 Hrs  T(C): --  T(F): --  HR: --  BP: --  BP(mean): --  RR: --  SpO2: --          12-21-22 @ 07:01  -  12-22-22 @ 07:00  --------------------------------------------------------  IN: 330 mL / OUT: 850 mL / NET: -520 mL    12-22-22 @ 07:01  -  12-22-22 @ 15:17  --------------------------------------------------------  IN: 240 mL / OUT: 200 mL / NET: 40 mL            Consultant(s) Notes Reviewed:  [x ] YES  [ ] NO          MEDICATIONS  (STANDING):  calcium carbonate    500 mG (Tums) Chewable 1 Tablet(s) Chew daily  chlorhexidine 2% Cloths 1 Application(s) Topical <User Schedule>  furosemide    Tablet 40 milliGRAM(s) Oral every 12 hours  latanoprost 0.005% Ophthalmic Solution 1 Drop(s) Both EYES at bedtime  metoprolol tartrate 25 milliGRAM(s) Oral every 6 hours  polyethylene glycol 3350 17 Gram(s) Oral daily    MEDICATIONS  (PRN):  albuterol    90 MICROgram(s) HFA Inhaler 2 Puff(s) Inhalation every 6 hours PRN Shortness of Breath and/or Wheezing  glycopyrrolate Injectable 0.2 milliGRAM(s) IV Push every 6 hours PRN Secretions  LORazepam   Injectable 0.5 milliGRAM(s) IV Push every 4 hours PRN Agitation or anxiety  morphine  - Injectable 2 milliGRAM(s) IV Push every 1 hour PRN Pain or dyspnea      LABS                  Lactate Trend        CAPILLARY BLOOD GLUCOSE          Culture - Blood (collected 12-16-22 @ 08:44)  Source: .Blood None  Final Report (12-21-22 @ 18:01):    No Growth Final        RADIOLOGY & ADDITIONAL TESTS:    Imaging Personally Reviewed:  [ ] YES  [ ] NO    HEALTH ISSUES - PROBLEM Dx:  Palliative care by specialist    Lung cancer    Acute respiratory failure with hypoxia    Dyspnea    Anxiety            PHYSICAL EXAM:  GENERAL: ill-looking, mild distress, catechetic.   HEAD:  Atraumatic, Normocephalic.  EYES: EOMI, PERRLA, conjunctiva and sclera clear.  NECK: Supple, No JVD.  CHEST/LUNG: bilateral lower lung rales.   HEART: Irregular rate and rhythm; S1 S2.   ABDOMEN: Soft, Nontender, Nondistended; Bowel sounds present.  EXTREMITIES:  2+ Peripheral Pulses, No clubbing, cyanosis, or edema.  PSYCH: AAOx3.  NEUROLOGY: non-focal.  SKIN: No rashes or lesions.  
Pt seen and examined at bedside. No CP or SOB.       PAST MEDICAL & SURGICAL HISTORY:      VITAL SIGNS (Last 24 hrs):  T(C): --  HR: 110 (12-18-22 @ 17:10) (104 - 124)  BP: 112/62 (12-18-22 @ 17:10) (96/65 - 112/62)  RR: 19 (12-18-22 @ 17:10) (15 - 19)  SpO2: 95% (12-18-22 @ 17:10) (94% - 96%)  Wt(kg): --  Daily     Daily     I&O's Summary    18 Dec 2022 07:01  -  18 Dec 2022 19:40  --------------------------------------------------------  IN: 0 mL / OUT: 750 mL / NET: -750 mL        PHYSICAL EXAM:  GENERAL: NAD, well-developed  HEAD:  Atraumatic, Normocephalic  EYES: EOMI, PERRLA, conjunctiva and sclera clear  NECK: Supple, No JVD  CHEST/LUNG: rales BL, improved from yesterday   HEART: Regular rate and rhythm; No murmurs, rubs, or gallops  ABDOMEN: Soft, Nontender, Nondistended; Bowel sounds present  EXTREMITIES:  2+ Peripheral Pulses, No clubbing, cyanosis, or edema  PSYCH: AAOx3  NEUROLOGY: non-focal  SKIN: No rashes or lesions    Labs Reviewed  Spoke to patient in regards to abnormal labs.    CBC Full  -  ( 18 Dec 2022 08:04 )  WBC Count : 20.31 K/uL  Hemoglobin : 9.6 g/dL  Hematocrit : 30.4 %  Platelet Count - Automated : 360 K/uL  Mean Cell Volume : 79.0 fL  Mean Cell Hemoglobin : 24.9 pg  Mean Cell Hemoglobin Concentration : 31.6 g/dL  Auto Neutrophil # : 18.23 K/uL  Auto Lymphocyte # : 1.07 K/uL  Auto Monocyte # : 0.76 K/uL  Auto Eosinophil # : 0.02 K/uL  Auto Basophil # : 0.02 K/uL  Auto Neutrophil % : 89.8 %  Auto Lymphocyte % : 5.3 %  Auto Monocyte % : 3.7 %  Auto Eosinophil % : 0.1 %  Auto Basophil % : 0.1 %    BMP:    12-18 @ 18:25    Blood Urea Nitrogen - 21  Calcium - 8.5  Carbond Dioxide - 28  Chloride - 97  Creatinine - 0.7  Glucose - 124  Potassium - 3.9  Sodium - 138      Hemoglobin A1c -     Urine Culture:  12-16 @ 08:44 Urine culture: --    Culture Results:   No growth to date.  Method Type: --  Organism: --  Organism Identification: --  Specimen Source: .Blood None        COVID Labs  CRP:    Procalcitonin, Serum: 0.31 ng/mL (12-16-22 @ 08:44)    D-Dimer:  397 ng/mL DDU (12-17-22 @ 11:34)            Imaging reviewed independently and reviewed read    < from: Xray Chest 1 View- PORTABLE-Routine (Xray Chest 1 View- PORTABLE-Routine in AM.) (12.18.22 @ 08:16) >  Impression:    No significant change since the prior exam    < end of copied text >      MEDICATIONS  (STANDING):  albuterol/ipratropium for Nebulization 3 milliLiter(s) Nebulizer every 6 hours  amLODIPine   Tablet 10 milliGRAM(s) Oral daily  apixaban 5 milliGRAM(s) Oral every 12 hours  calcium carbonate    500 mG (Tums) Chewable 1 Tablet(s) Chew daily  cefepime   IVPB      cefepime   IVPB 2000 milliGRAM(s) IV Intermittent every 8 hours  famotidine    Tablet 20 milliGRAM(s) Oral daily  furosemide   Injectable 40 milliGRAM(s) IV Push every 12 hours  latanoprost 0.005% Ophthalmic Solution 1 Drop(s) Both EYES at bedtime  methylPREDNISolone sodium succinate Injectable 40 milliGRAM(s) IV Push two times a day  metoprolol tartrate 25 milliGRAM(s) Oral every 6 hours  metroNIDAZOLE  IVPB 500 milliGRAM(s) IV Intermittent every 8 hours  multivitamin 1 Tablet(s) Oral daily    MEDICATIONS  (PRN):      
TARIQ JUSTIN  82y, Male  Allergy: Allergy Status Unknown    Hospital Day: 8d    Patient seen and examined earlier today.  No acute events overnight.  resting comfortably. no complaints at the moment.      ROS: negative except as noted above    PMH/PSH:  PAST MEDICAL & SURGICAL HISTORY:      LAST 24-Hr EVENTS:    VITALS:  T(F): 96.8 (12-23-22 @ 01:22), Max: 96.8 (12-22-22 @ 20:46)  HR: 75 (12-23-22 @ 01:22)  BP: 112/63 (12-23-22 @ 01:22) (96/63 - 112/63)  RR: 20 (12-23-22 @ 01:22)  SpO2: 94% (12-23-22 @ 01:22)          TESTS & MEASUREMENTS:  Weight/BMI  53 (12-23-22 @ 01:22)    12-21-22 @ 07:01  -  12-22-22 @ 07:00  --------------------------------------------------------  IN: 330 mL / OUT: 850 mL / NET: -520 mL    12-22-22 @ 07:01  -  12-23-22 @ 07:00  --------------------------------------------------------  IN: 240 mL / OUT: 700 mL / NET: -460 mL                            Procalcitonin, Serum: 0.31 ng/mL (12-16-22 @ 08:44)    D-Dimer Assay, Quantitative: 397 ng/mL DDU (12-17-22 @ 11:34)      Serum Pro-Brain Natriuretic Peptide: 3665 pg/mL (12-15-22 @ 19:03)    COVID-19 PCR: Detected (12-20-22 @ 07:42)        A1C with Estimated Average Glucose Result: 5.3 % (12-16-22 @ 08:44)      Indwelling Urethral Catheter:     Connect To:  Straight Drainage/[place]    Indication:  Urinary Retention / Obstruction (12-20-22 @ 06:16) (not performed)  Indwelling Urethral Catheter:     Connect To:  Straight Drainage/[place]    Indication:  Urinary Retention / Obstruction (12-18-22 @ 06:53) (not performed)      RADIOLOGY, ECG, & ADDITIONAL TESTS:  12 Lead ECG:   Ventricular Rate 122 BPM    Atrial Rate 308 BPM    QRS Duration 102 ms    Q-T Interval 290 ms    QTC Calculation(Bazett) 413 ms    R Axis 137 degrees    T Axis -41 degrees    Diagnosis Line Atrial flutter with variable A-V block  Left posterior fascicular block  ST & T wave abnormality, consider inferior ischemia  Abnormal ECG    Confirmed by Aleksandar Shepard ([id number]) on 12/19/2022 12:34:22 AM (12-17-22 @ 11:01)    CT Chest No Cont:   ACC: [id number] EXAM:  CT CHEST                          PROCEDURE DATE:  12/17/2022          INTERPRETATION:  STUDY INDICATION: worsening cxr    Technique: CT of the chest was performed without intravenous contrast.   Multiplanar reformatted images provided. MIPS provided.    Comparison: Chest CT  None.    Findings:    Mediastinum/Lymph Nodes: Multiple mildly enlarged mediastinal and likely   hilar lymph nodes.    Heart/Great Vessels: Visually the heart is without significant   enlargement. No pericardial effusion.    Ectatic ascending aorta measuring approximately 4.2 cm at the level of   the main pulmonary artery.    Abdomen: Limited evaluation based on technique. No acute inflammatory   changes. Left renal cyst.    Lungs, Pleura, and Airways:Emphysema. Bilateral small-to-moderate pleural   effusions. Bilateral dependent dense consolidations with obliteration of   interspersed airways. Additional bilateral multilobar and dependent   groundglass and nodular opacities. Approximately 3 to4 cm central   cavitary lesion in the right lower lobe contiguous with the airways   interspersed within the consolidation and limited in evaluation..    Bones and soft tissues: Approximately 3 cm lytic lesion along the left   scapular angle. Osteopenia. Degenerative changes of the spine    IMPRESSION:    Bilateral small-to-moderate pleural effusions. Bilateral dependent dense   consolidations with obliteration of interspersed airways. Additional   bilateral multilobar and dependent groundglass and nodular opacities.   Findings most likely reflecting a combination of pneumonia and   atelectasis.    Approximately 3 to 4 cm central cavitary lesion in the right lower lobe   contiguous with the airways interspersed within the consolidation and   limited in evaluation.    Underlying lung mass is likely. No comparison images are available.   Short-term follow-up CT and correlation with oncologic history is   recommended.    3 cm lytic lesion of the left scapular angle is likely metastatic.    Mediastinal lymphadenopathy.    --- End of Report ---            BENJAMIN BELTRÁN MD; Attending Radiologist  This document has been electronically signed. Dec 17 2022  1:05PM (12-17-22 @ 12:48)    RECENT DIAGNOSTIC ORDERS:      MEDICATIONS:  MEDICATIONS  (STANDING):  calcium carbonate    500 mG (Tums) Chewable 1 Tablet(s) Chew daily  chlorhexidine 2% Cloths 1 Application(s) Topical <User Schedule>  furosemide    Tablet 40 milliGRAM(s) Oral every 12 hours  latanoprost 0.005% Ophthalmic Solution 1 Drop(s) Both EYES at bedtime  metoprolol tartrate 25 milliGRAM(s) Oral every 6 hours  polyethylene glycol 3350 17 Gram(s) Oral daily    MEDICATIONS  (PRN):  albuterol    90 MICROgram(s) HFA Inhaler 2 Puff(s) Inhalation every 6 hours PRN Shortness of Breath and/or Wheezing  glycopyrrolate Injectable 0.2 milliGRAM(s) IV Push every 6 hours PRN Secretions  LORazepam   Injectable 0.5 milliGRAM(s) IV Push every 4 hours PRN Agitation or anxiety  morphine  - Injectable 2 milliGRAM(s) IV Push every 1 hour PRN Pain or dyspnea      HOME MEDICATIONS:  albuterol 0.63 mg/3 mL (0.021%) inhalation solution (12-16)  amLODIPine 10 mg oral tablet (12-16)  Bonifacio-Gest 500 mg oral tablet, chewable (12-16)  Daily Qasim oral tablet (12-16)  Eliquis 5 mg oral tablet (12-16)  famotidine 20 mg oral tablet (12-16)  furosemide 20 mg oral tablet (12-16)  latanoprost 0.005% ophthalmic solution (12-16)  Metoprolol Tartrate 25 mg oral tablet (12-16)  MiraLax oral powder for reconstitution (12-16)  ondansetron 8 mg oral tablet (12-16)  oseltamivir 75 mg oral capsule (12-16)  potassium chloride 20 mEq oral tablet, extended release (12-16)      PHYSICAL EXAM:  GENERAL: ill-looking, mild distress, catechetic.   HEAD:  Atraumatic, Normocephalic.  EYES: EOMI, PERRLA, conjunctiva and sclera clear.  NECK: Supple, No JVD.  CHEST/LUNG: bilateral lower lung rales.   HEART: Irregular rate and rhythm; S1 S2.   ABDOMEN: Soft, Nontender, Nondistended; Bowel sounds present.  EXTREMITIES:  2+ Peripheral Pulses, No clubbing, cyanosis, or edema.  PSYCH: AAOx3.  NEUROLOGY: non-focal.  SKIN: No rashes or lesions.        
  TARIQ JUSTIN  82y, Male    All available historical data reviewed    OVERNIGHT EVENTS:  no fevers  Bipap  does respond and is comfortale    ROS:  not reliable    VITALS:  T(F): 97, Max: 97 (12-20-22 @ 05:21)  HR: 109  BP: 113/69  RR: 20Vital Signs Last 24 Hrs  T(C): 36.1 (20 Dec 2022 05:21), Max: 36.1 (20 Dec 2022 05:21)  T(F): 97 (20 Dec 2022 05:21), Max: 97 (20 Dec 2022 05:21)  HR: 109 (20 Dec 2022 13:34) (80 - 144)  BP: 113/69 (20 Dec 2022 12:13) (98/59 - 136/82)  BP(mean): 86 (20 Dec 2022 12:13) (86 - 90)  RR: 20 (20 Dec 2022 10:09) (18 - 20)  SpO2: 97% (20 Dec 2022 13:34) (94% - 98%)    Parameters below as of 20 Dec 2022 13:34  Patient On (Oxygen Delivery Method): nasal cannula  O2 Flow (L/min): 5      TESTS & MEASUREMENTS:                        9.1    23.73 )-----------( 309      ( 20 Dec 2022 05:54 )             29.1     12-20    144  |  101  |  38<H>  ----------------------------<  118<H>  3.7   |  31  |  0.8    Ca    9.0      20 Dec 2022 05:54  Mg     2.2     12-20    TPro  5.2<L>  /  Alb  2.4<L>  /  TBili  0.6  /  DBili  x   /  AST  7   /  ALT  6   /  AlkPhos  94  12-20    LIVER FUNCTIONS - ( 20 Dec 2022 05:54 )  Alb: 2.4 g/dL / Pro: 5.2 g/dL / ALK PHOS: 94 U/L / ALT: 6 U/L / AST: 7 U/L / GGT: x             Culture - Blood (collected 12-16-22 @ 08:44)  Source: .Blood None  Preliminary Report (12-17-22 @ 18:01):    No growth to date.            RADIOLOGY & ADDITIONAL TESTS:  Personal review of radiological diagnostics performed  Echo and EKG results noted when applicable.     MEDICATIONS:  albuterol/ipratropium for Nebulization 3 milliLiter(s) Nebulizer every 6 hours  calcium carbonate    500 mG (Tums) Chewable 1 Tablet(s) Chew daily  chlorhexidine 2% Cloths 1 Application(s) Topical <User Schedule>  enoxaparin Injectable 60 milliGRAM(s) SubCutaneous every 12 hours  famotidine    Tablet 20 milliGRAM(s) Oral daily  furosemide   Injectable 40 milliGRAM(s) IV Push every 12 hours  latanoprost 0.005% Ophthalmic Solution 1 Drop(s) Both EYES at bedtime  levoFLOXacin IVPB 500 milliGRAM(s) IV Intermittent every 24 hours  levoFLOXacin IVPB      methylPREDNISolone sodium succinate Injectable 40 milliGRAM(s) IV Push two times a day  metoprolol tartrate Injectable 5 milliGRAM(s) IV Push every 6 hours  morphine  - Injectable 0.5 milliGRAM(s) IV Push every 2 hours PRN  multivitamin 1 Tablet(s) Oral daily  piperacillin/tazobactam IVPB.. 3.375 Gram(s) IV Intermittent every 8 hours      ANTIBIOTICS:  levoFLOXacin IVPB      levoFLOXacin IVPB 500 milliGRAM(s) IV Intermittent every 24 hours  piperacillin/tazobactam IVPB.. 3.375 Gram(s) IV Intermittent every 8 hours    
Pt seen and examined at bedside. No CP or SOB.      PAST MEDICAL & SURGICAL HISTORY:      VITAL SIGNS (Last 24 hrs):  T(C): 36.1 (12-16-22 @ 08:00), Max: 37.1 (12-15-22 @ 19:12)  HR: 115 (12-16-22 @ 11:25) (90 - 130)  BP: 96/53 (12-16-22 @ 11:25) (96/53 - 132/60)  RR: 18 (12-16-22 @ 11:25) (18 - 38)  SpO2: 95% (12-16-22 @ 11:25) (93% - 99%)  Wt(kg): --  Daily     Daily     I&O's Summary    16 Dec 2022 07:01  -  16 Dec 2022 14:17  --------------------------------------------------------  IN: 0 mL / OUT: 375 mL / NET: -375 mL        PHYSICAL EXAM:  GENERAL: NAD, elderly, fraile  HEAD:  Atraumatic, Normocephalic  EYES: EOMI, PERRLA, conjunctiva and sclera clear  NECK: Supple, No JVD  CHEST/LUNG: BL rales  HEART: Regular rate and rhythm; No murmurs, rubs, or gallops  ABDOMEN: Soft, Nontender, Nondistended; Bowel sounds present  EXTREMITIES:  2+ Peripheral Pulses, No clubbing, cyanosis, or edema  PSYCH: AAOx3  NEUROLOGY: non-focal  SKIN: No rashes or lesions    Labs Reviewed  Spoke to patient in regards to abnormal labs.    CBC Full  -  ( 16 Dec 2022 11:54 )  WBC Count : 13.74 K/uL  Hemoglobin : 7.5 g/dL  Hematocrit : 25.0 %  Platelet Count - Automated : 308 K/uL  Mean Cell Volume : 80.6 fL  Mean Cell Hemoglobin : 24.2 pg  Mean Cell Hemoglobin Concentration : 30.0 g/dL  Auto Neutrophil # : 12.48 K/uL  Auto Lymphocyte # : 0.67 K/uL  Auto Monocyte # : 0.50 K/uL  Auto Eosinophil # : 0.00 K/uL  Auto Basophil # : 0.01 K/uL  Auto Neutrophil % : 90.8 %  Auto Lymphocyte % : 4.9 %  Auto Monocyte % : 3.6 %  Auto Eosinophil % : 0.0 %  Auto Basophil % : 0.1 %    BMP:    12-16 @ 08:44    Blood Urea Nitrogen - 24  Calcium - 8.4  Carbond Dioxide - 27  Chloride - 96  Creatinine - 0.8  Glucose - 134  Potassium - 4.0  Sodium - 132      Hemoglobin A1c -     Urine Culture:        COVID Labs  CRP:      D-Dimer:            Imaging reviewed independently and reviewed read    < from: Xray Chest 1 View- PORTABLE-Urgent (12.15.22 @ 19:59) >  Impression:    Right greater than left bibasilar opacities and right pleural effusion.    < end of copied text >      < from: TTE Echo Complete w/ Contrast w/ Doppler (12.16.22 @ 09:52) >    Summary:   1. LV Ejection Fraction by Oliveira's Method with a biplane EF of 53 %.   2. Mildly increased LV wall thickness.   3. Normal left ventricular internal cavity size.   4. Mildly enlarged right atrium.   5. Normal left atrial size.   6. There is no evidence of pericardial effusion.   7. Mild thickening of the anterior and posterior mitral valve leaflets.   8. Moderate mitral valve regurgitation.   9. Mild-moderate tricuspid regurgitation.    < end of copied text >    MEDICATIONS  (STANDING):  albuterol/ipratropium for Nebulization 3 milliLiter(s) Nebulizer every 6 hours  amLODIPine   Tablet 10 milliGRAM(s) Oral daily  apixaban 5 milliGRAM(s) Oral every 12 hours  azithromycin  IVPB 500 milliGRAM(s) IV Intermittent every 24 hours  calcium carbonate    500 mG (Tums) Chewable 1 Tablet(s) Chew daily  cefTRIAXone   IVPB 1000 milliGRAM(s) IV Intermittent every 24 hours  famotidine    Tablet 20 milliGRAM(s) Oral daily  latanoprost 0.005% Ophthalmic Solution 1 Drop(s) Both EYES at bedtime  magnesium sulfate  IVPB 2 Gram(s) IV Intermittent once  metoprolol tartrate 25 milliGRAM(s) Oral every 6 hours  multivitamin 1 Tablet(s) Oral daily    MEDICATIONS  (PRN):      
Pt seen and examined at bedside. No CP or SOB. Hypoxic this am      PAST MEDICAL & SURGICAL HISTORY:      VITAL SIGNS (Last 24 hrs):  T(C): 36 (12-17-22 @ 08:17), Max: 36.5 (12-16-22 @ 19:00)  HR: 130 (12-17-22 @ 11:14) (78 - 130)  BP: 103/57 (12-17-22 @ 11:14) (96/64 - 127/58)  RR: 19 (12-17-22 @ 11:14) (16 - 19)  SpO2: 94% (12-17-22 @ 11:14) (85% - 97%)  Wt(kg): --  Daily     Daily     I&O's Summary    16 Dec 2022 07:01  -  17 Dec 2022 07:00  --------------------------------------------------------  IN: 0 mL / OUT: 750 mL / NET: -750 mL        PHYSICAL EXAM:  GENERAL: NAD, well-developed  HEAD:  Atraumatic, Normocephalic  EYES: EOMI, PERRLA, conjunctiva and sclera clear  NECK: Supple, No JVD  CHEST/LUNG: BL rales   HEART: Regular rate and rhythm; No murmurs, rubs, or gallops  ABDOMEN: Soft, Nontender, Nondistended; Bowel sounds present  EXTREMITIES:  2+ Peripheral Pulses, No clubbing, cyanosis, edema+  PSYCH: AAOx3  NEUROLOGY: non-focal  SKIN: No rashes or lesions    Labs Reviewed  Spoke to patient in regards to abnormal labs.    CBC Full  -  ( 17 Dec 2022 06:05 )  WBC Count : 18.18 K/uL  Hemoglobin : 8.3 g/dL  Hematocrit : 26.8 %  Platelet Count - Automated : 349 K/uL  Mean Cell Volume : 79.8 fL  Mean Cell Hemoglobin : 24.7 pg  Mean Cell Hemoglobin Concentration : 31.0 g/dL  Auto Neutrophil # : 16.23 K/uL  Auto Lymphocyte # : 0.95 K/uL  Auto Monocyte # : 0.83 K/uL  Auto Eosinophil # : 0.00 K/uL  Auto Basophil # : 0.02 K/uL  Auto Neutrophil % : 89.3 %  Auto Lymphocyte % : 5.2 %  Auto Monocyte % : 4.6 %  Auto Eosinophil % : 0.0 %  Auto Basophil % : 0.1 %    BMP:    12-17 @ 06:05    Blood Urea Nitrogen - 25  Calcium - 8.7  Carbond Dioxide - 28  Chloride - 95  Creatinine - 0.7  Glucose - 106  Potassium - 4.2  Sodium - 135      Hemoglobin A1c -     Urine Culture:        COVID Labs  CRP:    Procalcitonin, Serum: 0.31 ng/mL (12-16-22 @ 08:44)    D-Dimer:  397 ng/mL DDU (12-17-22 @ 11:34)            Imaging reviewed independently and reviewed read    < from: CT Chest No Cont (12.17.22 @ 12:48) >  IMPRESSION:    Bilateral small-to-moderate pleural effusions. Bilateral dependent dense   consolidations with obliteration of interspersed airways. Additional   bilateral multilobar and dependent groundglass and nodular opacities.   Findings most likely reflecting a combination of pneumonia and   atelectasis.    Approximately 3 to 4 cm central cavitary lesion in the right lower lobe   contiguous with the airways interspersed within the consolidation and   limited in evaluation.    Underlying lung mass is likely. No comparison images are available.   Short-term follow-up CT and correlation with oncologic history is   recommended.    3 cm lytic lesion of the left scapular angle is likely metastatic.    Mediastinal lymphadenopathy.    < end of copied text >    < from: TTE Echo Complete w/ Contrast w/ Doppler (12.16.22 @ 09:52) >  ummary:   1. LV Ejection Fraction by Oliveira's Method with a biplane EF of 53 %.   2. Mildly increased LV wall thickness.   3. Normal left ventricular internal cavity size.   4. Mildly enlarged right atrium.   5. Normal left atrial size.   6. There is no evidence of pericardial effusion.   7. Mild thickening of the anterior and posterior mitral valve leaflets.   8. Moderate mitral valve regurgitation.   9. Mild-moderate tricuspid regurgitation.    < end of copied text >    MEDICATIONS  (STANDING):  albuterol/ipratropium for Nebulization 3 milliLiter(s) Nebulizer every 6 hours  amLODIPine   Tablet 10 milliGRAM(s) Oral daily  apixaban 5 milliGRAM(s) Oral every 12 hours  azithromycin  IVPB 500 milliGRAM(s) IV Intermittent every 24 hours  calcium carbonate    500 mG (Tums) Chewable 1 Tablet(s) Chew daily  cefepime   IVPB      famotidine    Tablet 20 milliGRAM(s) Oral daily  furosemide   Injectable 40 milliGRAM(s) IV Push daily  latanoprost 0.005% Ophthalmic Solution 1 Drop(s) Both EYES at bedtime  metoprolol tartrate 25 milliGRAM(s) Oral every 6 hours  multivitamin 1 Tablet(s) Oral daily    MEDICATIONS  (PRN):      
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   Patient reports that patient has been having worsening SOB associated with non-productive cough for the past week. Today, he was found to have SpO2 in the 70's% which prompted him to be brought to the ED from NH. He was given Duoneb and Decadron by EMS. Initially, patient was placed on BiPAP saturating 99% then to oxygen mask.    He denies fever, chills, rhinorrhea, chest pain. No abdominal or urinary symptoms.     Interval History  -Patient seen at bedside  -Patient denies pain, notes some mildly increased SOB  -No nonverbal pain or distress noted on exam    ADVANCE DIRECTIVES:    [ ] Full Code [x ] DNR  MOLST  [ ]  Living Will  [ ]   DECISION MAKER(s):  [ ] Health Care Proxy(s)  [x ] Surrogate(s)  [ ] Guardian           Name(s): Phone Number(s): Wife    BASELINE (I)ADL(s) (prior to admission):  [place]: [ ]Total  [ x] Moderate [ ]Dependent  Palliative Performance Status Version 2:         50%    http://npcrc.org/files/news/palliative_performance_scale_[email or web address].pdf    Allergies  Allergy Status Unknown    MEDICATIONS  (STANDING):  calcium carbonate    500 mG (Tums) Chewable 1 Tablet(s) Chew daily  chlorhexidine 2% Cloths 1 Application(s) Topical <User Schedule>  furosemide    Tablet 40 milliGRAM(s) Oral every 12 hours  latanoprost 0.005% Ophthalmic Solution 1 Drop(s) Both EYES at bedtime  metoprolol tartrate 25 milliGRAM(s) Oral every 6 hours  polyethylene glycol 3350 17 Gram(s) Oral daily    MEDICATIONS  (PRN):  albuterol    90 MICROgram(s) HFA Inhaler 2 Puff(s) Inhalation every 6 hours PRN Shortness of Breath and/or Wheezing  glycopyrrolate Injectable 0.2 milliGRAM(s) IV Push every 6 hours PRN Secretions  LORazepam   Injectable 0.5 milliGRAM(s) IV Push every 4 hours PRN Agitation or anxiety  morphine  - Injectable 2 milliGRAM(s) IV Push every 1 hour PRN Pain or dyspnea    PRESENT SYMPTOMS: [ ]Unable to obtain due to poor mentation   Source if other than patient:  [ ]Family   [ ]Team     Pain: [ ]yes [x ]no  QOL impact -   Location -                    Aggravating factors -  Quality -  Radiation -  Timing-  Severity (0-10 scale):  Minimal acceptable level (0-10 scale):         Dyspnea:                           [x ]Mild [ ]Moderate [ ]Severe [ ]None  Anxiety:                             [ ]Mild [ ]Moderate [ ]Severe [x ]None  Fatigue:                             [ ]Mild [ ]Moderate [ ]Severe [x ]None  Nausea:                             [ ]Mild [ ]Moderate [ ]Severe [x ]None  Loss of appetite:              [ ]Mild [ ]Moderate [ ]Severe [x ]None  Constipation:                    [ ]Mild [ ]Moderate [ ]Severe [x ]None    Other Symptoms:  [x ]All other review of systems negative     Palliative Performance Status Version 2:         30%    http://npcrc.org/files/news/palliative_performance_scale_ppsv2.pdf    PHYSICAL EXAM:  Vital Signs Last 24 Hrs  T(C): --  T(F): --  HR: --  BP: -- no vitals as comfort measures only  BP(mean): --  RR: --  SpO2: --      GENERAL:  [x ] No acute distress [ ]Lethargic  [ ]Unarousable  [x ]Verbal  [ ]Non-Verbal [ ]Cachexia    BEHAVIORAL/PSYCH:  [x ]Alert and Oriented x 3 [ ] Anxiety [ ] Delirium [ ] Agitation [x ] Calm   EYES: [x ] No scleral icterus [ ] Scleral icterus [ ] Closed  ENMT:  [ ]Dry mouth  [x ]No external oral lesions [ ] No external ear or nose lesions  CARDIOVASCULAR:  [ ]Regular [ ]Irregular [ x]Tachy [ ]Not Tachy  [ ]Cabrera [ ] Edema [ ] No edema  PULMONARY:  [ ]Tachypnea  [ ]Audible excessive secretions [ ] No labored breathing [ x] mildly labored breathing  GASTROINTESTINAL: [ ]Soft  [ ]Distended  [x ]Not distended [ ]Non tender [ ]Tender  MUSCULOSKELETAL: [ ]No clubbing [ ] clubbing  [x ] No cyanosis [ ] cyanosis  NEUROLOGIC: [ ]No focal deficits  [ x]Follows commands  [ ]Does not follow commands  [ ]Cognitive impairment  [ ]Dysphagia  [ ]Dysarthria  [ ]Paresis   SKIN: [ ] Jaundiced [ ] Non-jaundiced [ ]Rash [ x]No Rash [ ] Warm [ ] Dry  MISC/LINES: [ ] ET tube [ ] Trach [ ]NGT/OGT [ ]PEG [ ]Pitts    CRITICAL CARE:  [ ] Shock Present  [ ]Septic [ ]Cardiogenic [ ]Neurologic [ ]Hypovolemic  [ ]  Vasopressors [ ]  Inotropes   [ x]Respiratory failure present [ ]Mechanical ventilation [ ]Non-invasive ventilatory support [ ]High flow  [ ]Acute  [ ]Chronic [ ]Hypoxic  [ ]Hypercarbic [ ]Other  [ ]Other organ failure     LABS: previous labs reviewed by me    RADIOLOGY & ADDITIONAL STUDIES: reviewed by me  < from: [facility] Duplex Lower Ext Vein Scan, Bilat (12.18.22 @ 20:16) >  IMPRESSION:  No evidence of deep venous thrombosis in either lower extremity.    < end of copied text >      EKG: reviewed by me  < from: 12 Lead ECG (12.17.22 @ 11:01) >  Ventricular Rate 122 BPM    Atrial Rate 308 BPM    QRS Duration 102 ms    Q-T Interval 290 ms    QTC Calculation(Bazett) 413 ms    R Axis 137 degrees    T Axis -41 degrees    Diagnosis Line Atrial flutter with variable A-V block  Left posterior fascicular block  ST & T wave abnormality, consider inferior ischemia  Abnormal ECG    < end of copied text >      PROTEIN CALORIE MALNUTRITION PRESENT: [ ]mild [ ]moderate [ ]severe [ ]underweight [ ]morbid obesity  https://www.andeal.org/vault/2440/web/files/ONC/Table_Clinical%20Characteristics%20to%20Document%20Malnutrition-White%20JV%20et%20al%202012.pdf      Weight (kg): 59.8 (12-19-22 @ 05:15)    [ ]PPSV2 < or = to 30% [ ]significant weight loss  [ ]poor nutritional intake  [ ]anasarca      [ ]Artificial Nutrition      REFERRALS:   [x ]Chaplaincy  [ ]Hospice  [ ]Child Life  [ x]Social Work  [ ]Case management [ ]Holistic Therapy     Goals of Care Document:   
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   Patient reports that patient has been having worsening SOB associated with non-productive cough for the past week. Today, he was found to have SpO2 in the 70's% which prompted him to be brought to the ED from NH. He was given Duoneb and Decadron by EMS. Initially, patient was placed on BiPAP saturating 99% then to oxygen mask.    He denies fever, chills, rhinorrhea, chest pain. No abdominal or urinary symptoms.     Interval History  -Patient seen at bedside  -Patient denies pain or SOB at time of visit  -No nonverbal pain or distress noted on exam    ADVANCE DIRECTIVES:    [ ] Full Code [x ] DNR  MOLST  [ ]  Living Will  [ ]   DECISION MAKER(s):  [ ] Health Care Proxy(s)  [x ] Surrogate(s)  [ ] Guardian           Name(s): Phone Number(s): Wife    BASELINE (I)ADL(s) (prior to admission):  [place]: [ ]Total  [ x] Moderate [ ]Dependent  Palliative Performance Status Version 2:         50%    http://npcrc.org/files/news/palliative_performance_scale_ppsv2.pdf    Allergies  Allergy Status Unknown    MEDICATIONS  (STANDING):  calcium carbonate    500 mG (Tums) Chewable 1 Tablet(s) Chew daily  chlorhexidine 2% Cloths 1 Application(s) Topical <User Schedule>  furosemide    Tablet 40 milliGRAM(s) Oral every 12 hours  latanoprost 0.005% Ophthalmic Solution 1 Drop(s) Both EYES at bedtime  metoprolol tartrate 25 milliGRAM(s) Oral every 6 hours  polyethylene glycol 3350 17 Gram(s) Oral daily    MEDICATIONS  (PRN):  albuterol    90 MICROgram(s) HFA Inhaler 2 Puff(s) Inhalation every 6 hours PRN Shortness of Breath and/or Wheezing  glycopyrrolate Injectable 0.2 milliGRAM(s) IV Push every 6 hours PRN Secretions  LORazepam   Injectable 0.5 milliGRAM(s) IV Push every 4 hours PRN Agitation or anxiety  morphine  - Injectable 2 milliGRAM(s) IV Push every 1 hour PRN Pain or dyspnea    PRESENT SYMPTOMS: [ ]Unable to obtain due to poor mentation   Source if other than patient:  [ ]Family   [ ]Team     Pain: [ ]yes [x ]no  QOL impact -   Location -                    Aggravating factors -  Quality -  Radiation -  Timing-  Severity (0-10 scale):  Minimal acceptable level (0-10 scale):         Dyspnea:                           [ ]Mild [ ]Moderate [ ]Severe [ x]None  Anxiety:                             [ ]Mild [ ]Moderate [ ]Severe [x ]None  Fatigue:                             [ ]Mild [ ]Moderate [ ]Severe [x ]None  Nausea:                             [ ]Mild [ ]Moderate [ ]Severe [x ]None  Loss of appetite:              [ ]Mild [ ]Moderate [ ]Severe [x ]None  Constipation:                    [ ]Mild [ ]Moderate [ ]Severe [x ]None    Other Symptoms:  [x ]All other review of systems negative     Palliative Performance Status Version 2:         30%    http://npcrc.org/files/news/palliative_performance_scale_ppsv2.pdf    PHYSICAL EXAM:  Vital Signs Last 24 Hrs  T(C): --  T(F): --  HR: --  BP: -- no vitals as comfort measures only  BP(mean): --  RR: --  SpO2: --      GENERAL:  [x ] No acute distress [ ]Lethargic  [ ]Unarousable  [x ]Verbal  [ ]Non-Verbal [ ]Cachexia    BEHAVIORAL/PSYCH:  [x ]Alert and Oriented x 3 [ ] Anxiety [ ] Delirium [ ] Agitation [x ] Calm   EYES: [x ] No scleral icterus [ ] Scleral icterus [ ] Closed  ENMT:  [ ]Dry mouth  [x ]No external oral lesions [ ] No external ear or nose lesions  CARDIOVASCULAR:  [ ]Regular [ ]Irregular [ x]Tachy [ ]Not Tachy  [ ]Cabrera [ ] Edema [ ] No edema  PULMONARY:  [ ]Tachypnea  [ ]Audible excessive secretions [x ] No labored breathing [ ] mildly labored breathing  GASTROINTESTINAL: [ ]Soft  [ ]Distended  [x ]Not distended [ ]Non tender [ ]Tender  MUSCULOSKELETAL: [ ]No clubbing [ ] clubbing  [x ] No cyanosis [ ] cyanosis  NEUROLOGIC: [ ]No focal deficits  [ x]Follows commands  [ ]Does not follow commands  [ ]Cognitive impairment  [ ]Dysphagia  [ ]Dysarthria  [ ]Paresis   SKIN: [ ] Jaundiced [ ] Non-jaundiced [ ]Rash [ x]No Rash [ ] Warm [ ] Dry  MISC/LINES: [ ] ET tube [ ] Trach [ ]NGT/OGT [ ]PEG [ ]Pitts    LABS: previous labs reviewed by me    RADIOLOGY & ADDITIONAL STUDIES: reviewed by me  < from: [facility] Duplex Lower Ext Vein Scan, Bilat (12.18.22 @ 20:16) >  IMPRESSION:  No evidence of deep venous thrombosis in either lower extremity.    < end of copied text >      EKG: reviewed by me  < from: 12 Lead ECG (12.17.22 @ 11:01) >  Ventricular Rate 122 BPM    Atrial Rate 308 BPM    QRS Duration 102 ms    Q-T Interval 290 ms    QTC Calculation(Bazett) 413 ms    R Axis 137 degrees    T Axis -41 degrees    Diagnosis Line Atrial flutter with variable A-V block  Left posterior fascicular block  ST & T wave abnormality, consider inferior ischemia  Abnormal ECG    < end of copied text >      PROTEIN CALORIE MALNUTRITION PRESENT: [ ]mild [ ]moderate [ ]severe [ ]underweight [ ]morbid obesity  https://www.andeal.org/vault/2440/web/files/ONC/Table_Clinical%20Characteristics%20to%20Document%20Malnutrition-White%20JV%20et%20al%055358.pdf      Weight (kg): 59.8 (12-19-22 @ 05:15)    [ ]PPSV2 < or = to 30% [ ]significant weight loss  [ ]poor nutritional intake  [ ]anasarca      [ ]Artificial Nutrition      REFERRALS:   [x ]Chaplaincy  [ ]Hospice  [ ]Child Life  [ x]Social Work  [ ]Case management [ ]Holistic Therapy   
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   Patient reports that patient has been having worsening SOB associated with non-productive cough for the past week. Today, he was found to have SpO2 in the 70's% which prompted him to be brought to the ED from NH. He was given Duoneb and Decadron by EMS. Initially, patient was placed on BiPAP saturating 99% then to oxygen mask.    He denies fever, chills, rhinorrhea, chest pain. No abdominal or urinary symptoms.     Interval History  -Patient with BIPAP off at bedside with daughter  -Patient confirms wish for comfort measures only  -No nonverbal pain or distress noted on exam    ADVANCE DIRECTIVES:    [ ] Full Code [x ] DNR  MOLST  [ ]  Living Will  [ ]   DECISION MAKER(s):  [ ] Health Care Proxy(s)  [x ] Surrogate(s)  [ ] Guardian           Name(s): Phone Number(s): Wife    BASELINE (I)ADL(s) (prior to admission):  [place]: [ ]Total  [ x] Moderate [ ]Dependent  Palliative Performance Status Version 2:         50%    http://npcrc.org/files/news/palliative_performance_scale_ppsv2.pdf    Allergies  Allergy Status Unknown    MEDICATIONS  (STANDING):  albuterol/ipratropium for Nebulization 3 milliLiter(s) Nebulizer every 6 hours  calcium carbonate    500 mG (Tums) Chewable 1 Tablet(s) Chew daily  chlorhexidine 2% Cloths 1 Application(s) Topical <User Schedule>  famotidine    Tablet 20 milliGRAM(s) Oral daily  furosemide    Tablet 40 milliGRAM(s) Oral every 12 hours  latanoprost 0.005% Ophthalmic Solution 1 Drop(s) Both EYES at bedtime  metoprolol tartrate 25 milliGRAM(s) Oral every 6 hours    MEDICATIONS  (PRN):  glycopyrrolate Injectable 0.2 milliGRAM(s) IV Push every 6 hours PRN Secretions  LORazepam   Injectable 0.5 milliGRAM(s) IV Push every 4 hours PRN Agitation or anxiety  morphine  - Injectable 2 milliGRAM(s) IV Push every 1 hour PRN Pain or dyspnea    PRESENT SYMPTOMS: [ ]Unable to obtain due to poor mentation   Source if other than patient:  [ ]Family   [ ]Team     Pain: [ ]yes [x ]no  QOL impact -   Location -                    Aggravating factors -  Quality -  Radiation -  Timing-  Severity (0-10 scale):  Minimal acceptable level (0-10 scale):         Dyspnea:                           [ ]Mild [ ]Moderate [ ]Severe [x ]None  Anxiety:                             [ ]Mild [ ]Moderate [ ]Severe [x ]None  Fatigue:                             [ ]Mild [ ]Moderate [ ]Severe [x ]None  Nausea:                             [ ]Mild [ ]Moderate [ ]Severe [x ]None  Loss of appetite:              [ ]Mild [ ]Moderate [ ]Severe [x ]None  Constipation:                    [ ]Mild [ ]Moderate [ ]Severe [x ]None    Other Symptoms:  [x ]All other review of systems negative     Palliative Performance Status Version 2:         30%    http://npcrc.org/files/news/palliative_performance_scale_ppsv2.pdf    PHYSICAL EXAM:  Vital Signs Last 24 Hrs  T(C): 36.1 (21 Dec 2022 05:28), Max: 36.3 (20 Dec 2022 20:53)  T(F): 97 (21 Dec 2022 05:28), Max: 97.3 (20 Dec 2022 20:53)  HR: 106 (21 Dec 2022 05:50) (45 - 152)  BP: 108/63 (21 Dec 2022 05:28) (104/55 - 122/79)  BP(mean): 77 (21 Dec 2022 05:28) (73 - 93)  RR: 20 (21 Dec 2022 05:28) (18 - 20)  SpO2: 99% (21 Dec 2022 05:28) (97% - 99%)    Parameters below as of 21 Dec 2022 05:28  Patient On (Oxygen Delivery Method): BiPAP/CPAP    GENERAL:  [x ] No acute distress [ ]Lethargic  [ ]Unarousable  [x ]Verbal  [ ]Non-Verbal [ ]Cachexia    BEHAVIORAL/PSYCH:  [x ]Alert and Oriented x 3 [ ] Anxiety [ ] Delirium [ ] Agitation [x ] Calm   EYES: [x ] No scleral icterus [ ] Scleral icterus [ ] Closed  ENMT:  [ ]Dry mouth  [x ]No external oral lesions [ ] No external ear or nose lesions  CARDIOVASCULAR:  [ ]Regular [ ]Irregular [ x]Tachy [ ]Not Tachy  [ ]Cabrera [ ] Edema [ ] No edema  PULMONARY:  [ ]Tachypnea  [ ]Audible excessive secretions [x ] No labored breathing [ ] labored breathing  GASTROINTESTINAL: [ ]Soft  [ ]Distended  [x ]Not distended [ ]Non tender [ ]Tender  MUSCULOSKELETAL: [ ]No clubbing [ ] clubbing  [x ] No cyanosis [ ] cyanosis  NEUROLOGIC: [ ]No focal deficits  [ x]Follows commands  [ ]Does not follow commands  [ ]Cognitive impairment  [ ]Dysphagia  [ ]Dysarthria  [ ]Paresis   SKIN: [ ] Jaundiced [ ] Non-jaundiced [ ]Rash [ x]No Rash [ ] Warm [ ] Dry  MISC/LINES: [ ] ET tube [ ] Trach [ ]NGT/OGT [ ]PEG [ ]Pitts    CRITICAL CARE:  [ ] Shock Present  [ ]Septic [ ]Cardiogenic [ ]Neurologic [ ]Hypovolemic  [ ]  Vasopressors [ ]  Inotropes   [ x]Respiratory failure present [ ]Mechanical ventilation [ ]Non-invasive ventilatory support [ ]High flow  [ ]Acute  [ ]Chronic [ ]Hypoxic  [ ]Hypercarbic [ ]Other  [ ]Other organ failure     LABS: reviewed by me                          9.1    23.73 )-----------( 309      ( 20 Dec 2022 05:54 )             29.1       12-20    144  |  101  |  38<H>  ----------------------------<  118<H>  3.7   |  31  |  0.8    Ca    9.0      20 Dec 2022 05:54  Mg     2.2     12-20    TPro  5.2<L>  /  Alb  2.4<L>  /  TBili  0.6  /  DBili  x   /  AST  7   /  ALT  6   /  AlkPhos  94  12-20                            CAPILLARY BLOOD GLUCOSE                  RADIOLOGY & ADDITIONAL STUDIES: reviewed by me  < from: [facility] Duplex Lower Ext Vein Scan, Bilat (12.18.22 @ 20:16) >  IMPRESSION:  No evidence of deep venous thrombosis in either lower extremity.    < end of copied text >      EKG: reviewed by me  < from: 12 Lead ECG (12.17.22 @ 11:01) >  Ventricular Rate 122 BPM    Atrial Rate 308 BPM    QRS Duration 102 ms    Q-T Interval 290 ms    QTC Calculation(Bazett) 413 ms    R Axis 137 degrees    T Axis -41 degrees    Diagnosis Line Atrial flutter with variable A-V block  Left posterior fascicular block  ST & T wave abnormality, consider inferior ischemia  Abnormal ECG    < end of copied text >      PROTEIN CALORIE MALNUTRITION PRESENT: [ ]mild [ ]moderate [ ]severe [ ]underweight [ ]morbid obesity  https://www.andeal.org/vault/2440/web/files/ONC/Table_Clinical%20Characteristics%20to%20Document%20Malnutrition-White%20JV%20et%20al%202012.pdf      Weight (kg): 59.8 (12-19-22 @ 05:15)    [ ]PPSV2 < or = to 30% [ ]significant weight loss  [ ]poor nutritional intake  [ ]anasarca      [ ]Artificial Nutrition      REFERRALS:   [x ]Chaplaincy  [ ]Hospice  [ ]Child Life  [ x]Social Work  [ ]Case management [ ]Holistic Therapy     Goals of Care Document:

## 2022-12-24 NOTE — PROGRESS NOTE ADULT - ASSESSMENT
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 Hospital course complicated by respiratory failure in the setting of pneumonia and metastatic lung cancer requiring BIPAP.  Palliative care consulted for GOC.    Patient now comfort measures only. No symptoms at time of visit. No barrier to discharge from a palliative care perspective.    MEDD (morphine equivalent daily dose):  6mg      See Recs below.    Please call [phone] with questions or concerns 24/7.   We will continue to follow.     Discussed with primary MD.  
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 Hospital course complicated by respiratory failure in the setting of pneumonia and metastatic lung cancer requiring BIPAP.  Palliative care consulted for GOC.    Patient now comfort measures only. Will place hospice consult if patient stable after 24 hours.    MEDD (morphine equivalent daily dose): NA      See Recs below.    Please call [phone] with questions or concerns 24/7.   We will continue to follow.     Discussed with primary MD.  
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 SpO2 in the 70's%. from NH.   He denies fever, chills.    IMPRESSION;   Possible bacterial PNA b/l superimposed on extensive underlying lung cancer.  WBC 20.312/16 BCx NG    COVID-19 with recent conversion  Doubt her oxygenation is COVID-19 related and doubt has severe disease  12/20 COVID-19 positive  12/15 COVID 19/ Influenza/ RSV NG.     RECOMMENDATIONS;  Urine for strep pneumonia/legionella antigen  Nares ORSA  Sputum gm stain, cultures.  Start  mg iv on Day 1, then 100 mg iv D2 and D3.  Zosyn 3.375 gm iv q8h over 4h  Levoquin 500 mg iv q24h  Vancomycin 1 gm iv q12h ( hold if ORSA NG )  Off loading to prevent pressure sores and preventive measures to avoid aspiration 
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 He was admitted for sepsis secondary to PNA.     #AHRF 2/2 sepsis 2/2 suspected gram neg pneumonia   Was found to have SpO2 in the 70's%; placed initially on BiPAP saturating 99% then to oxygen mask.  lactic acidosis    - Sepsis on admission ( RR 38 WBC 23.81 with 88.7% neutrophils)  - ABG's pH 7.34 pCO2 46 pO2 24 HCO3 25 Lactate 4.9  - RVP and COVID negative.   - Chest X-ray: congestion and bilateral opacities, Right greater than left bibasilar opacities and right pleural effusion.  - moderate effusion and pneumonia   - Currently on SpO2 93% on 3L NC  - s/p Levaquin in ED  - given worsneing o2 sat and wbc  - continue cefepime and flagyl (added as per pulm) M  - ID consult   - Pulm consult effusion of ct chest   - procal 0.31  - MRSA, urine Strep and legionella   - Follow up blood cultures   - Follow up repeat lactate   - c/w Duoneb   - Wean O2 as tolerated  - start on BIPAP 4/4 off, wean back to NC, if requirning HFNC, dw pulm for SDU    #leukocytosis likely 2.2 steroids- trend, pt is on antibiotics.     #Troponemia likely secondary to NSTEMI type II  - Troponin 0.02-->0.01  - EKG: A.fib  - No active chest pain  - Trend Troponin  - Follow up A1c and lipid profile   - echo noted    #Acute on chronic HF with normal EF, effusion on cxr, possible component of Afib   - pro BNP 3665  - s/p 60 mg Lasix  - lasix 40 mg BID   - Echo as above   - cxr in am   - pulm consult appreciated    #lactic acidosis- 4.3 on admission -->1.3 now     #Anemia  - Hb 8.8  - Follow up iron studies  - Monitor CBC and transfuse if Hb<7  - Keep active type and screen     #chronic A.fib on Eliquis - controlled  - c/w Eliquis and Metoprolol     #HTN  - c/w amlodipine      #Stage IV lung cancer with metastasis to brain s/p chemotherapy - Follows with Marg[facility]     #Progress Note Handoff  Pending (specify):  pneumonia and chf management   Family discussion: dw daughter at bedside. if pt worsens family is agreeable  for hospice, may need palliative care if worsening. pt DNR/DNI   Disposition: [facility], from [facility]         
83 yo M PMHx chronic a. 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 He was admitted for sepsis secondary to PNA.     A/P:   Acute Hypoxic Respiratory failure:   Gram negative Pneumonia:   COVID-19 infection.   Stage IV lung cancer with metastasis to brain s/p chemotherapy  Chest X-ray: congestion and bilateral opacities, Right greater than left bibasilar opacities and right pleural effusion.  On BiPAP 4 hrs on 4 hrs off.   On Antibiotics Levaquin and Zosyn.   After discussion with Family, patient was made comfort measures only.   No further BiPAP, hold IV antibiotics, IV fluid, IV nutrition, no labs.   Continue Lasix.     Chronic Atrial Fibrillation: Continue Metoprolol.   HTN: on Norvasc.        DNR/DNI. CMO.     Progress Note Handoff  Pending: Comfort care, placement.   Family Discussion: discussed comfort care measures, stopping IV antibiotics,   Disposition: SNF with hospice. 
81 yo M PMHx chronic a. 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 He was admitted for sepsis secondary to PNA.     A/P:   Acute Hypoxic Respiratory failure:   Gram negative Pneumonia:   COVID-19 infection.   Stage IV lung cancer with metastasis to brain s/p chemotherapy  Chest X-ray: congestion and bilateral opacities, Right greater than left bibasilar opacities and right pleural effusion.  After discussion with Family, patient was made comfort measures only.   No further BiPAP, hold IV antibiotics, IV fluid, IV nutrition, no labs.   Continue Lasix.     Chronic Atrial Fibrillation: Continue Metoprolol.   HTN: on Norvasc.        DNR/DNI. CMO.     Progress Note Handoff  Pending: Comfort care, placement.   Family Discussion: discussed comfort care measures, stopping IV antibiotics,   Disposition: SNF with hospice.   
81 yo M PMHx chronic a. 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 He was admitted for sepsis secondary to PNA.     AHRF likely due to suspected gram negative pneumonia  - Was found to have SpO2 in the 70's%; placed initially on BiPAP with improvement  - Sepsis present on admission ( RR 38 WBC 23.81 with 88.7% neutrophils)  - ABG's pH 7.34 pCO2 46 pO2 24 HCO3 25 Lactate 4.9  - RVP and COVID negative.   - Chest X-ray: congestion and bilateral opacities, Right greater than left bibasilar opacities and right pleural effusion.  - moderate effusion and pneumonia   - ID eval appreciated, zosyn, Levaquin vancomycin  - procal 0.31  - pulmonary recommended 4 hours of bipap on then 4 hours off with HFNC. Will c/w HFNC for now  - pt has pleural effusions and b/l LE 3+ pitting edema to just above ankles  - c/w IV lasix  - pt and family considering hospice  - pulmonary recommending solumedrol  - pro BNP 3665  - elevated LA on admission, now resolved    Stage IV lung cancer with metastasis to brain   - currently on chemo at [facility]  - family considering hospice. Hospice referral placed    Troponemia   - modestly elevated  - likely due to demant    Chronic Anemia  - microcytic  - likely due to chronic diease      Chronic A.fib on Eliquis   - HR was uncontrolled likely due to missed metoprolol  - HR improved  - c/w Eliquis and Metoprolol     HTN  - c/w amlodipine      #Progress Note Handoff  Pending (specify):  hospice eval  Family discussion: dw daughter, wife, son at bedside. They are all in agreeement for palliative/hospice eval      Disposition: [facility], from [facility]         
83 yo M PMHx chronic a. 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 He was admitted for sepsis secondary to PNA.     A/P:   Acute Hypoxic Respiratory failure:   Gram negative Pneumonia:   COVID-19 infection.   Stage IV lung cancer with metastasis to brain s/p chemotherapy  Chest X-ray: congestion and bilateral opacities, Right greater than left bibasilar opacities and right pleural effusion.  After discussion with Family, patient was made comfort measures only.   No further BiPAP, hold IV antibiotics, IV fluid, IV nutrition, no labs.   Continue Lasix.     Chronic Atrial Fibrillation: Continue Metoprolol.   HTN: on Norvasc.        DNR/DNI. CMO.     Progress Note Handoff  Pending: Comfort care, placement.   Family Discussion: discussed comfort care measures, stopping IV antibiotics,   Disposition: SNF with hospice.   
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 He was admitted for sepsis secondary to PNA.     #AHRF 2/2 sepsis 2/2 suspected gram neg pneumonia   Was found to have SpO2 in the 70's%; placed initially on BiPAP saturating 99% then to oxygen mask.  lactic acidosis    - Sepsis on admission ( RR 38 WBC 23.81 with 88.7% neutrophils)  - ABG's pH 7.34 pCO2 46 pO2 24 HCO3 25 Lactate 4.9  - RVP and COVID negative.   - Chest X-ray: congestion and bilateral opacities, Right greater than left bibasilar opacities and right pleural effusion.  - moderate effusion and pneumonia   - Currently on SpO2 93% on 3L NC  - s/p Levaquin in ED  - given worsneing o2 sat and wbc, will broaden to cefepime and vanco   - ID consult   - Pulm consult effusion of ct chest   - procal 0.31  - MRSA, urine Strep and legionella   - Follow up blood cultures   - Follow up repeat lactate   - c/w Duoneb   - Wean O2 as tolerated    #Troponemia likely secondary to NSTEMI type II  - Troponin 0.02-->0.01  - EKG: A.fib  - No active chest pain  - Trend Troponin  - Follow up A1c and lipid profile   - echo noted    #Acute on chronic HF with normal EF, effusion on cxr, possible component of Afib   - pro BNP 3665  - s/p 60 mg Lasix  - lasix 40 mg BID increased  - Echo as above   - cxr in am   - pulm consult     #lactic acidosis- 4.3 on admission -->1.3 now     #Anemia  - Hb 8.8  - Follow up iron studies  - Monitor CBC and transfuse if Hb<7  - Keep active type and screen     #chronic A.fib on Eliquis - controlled  - c/w Eliquis and Metoprolol     #HTN  - c/w amlodipine      #Stage IV lung cancer with metastasis to brain s/p chemotherapy - Follows with Margarita     #Progress Note Handoff  Pending (specify):  pneumonia and chf managment   Family discussion: dw family at bedside  Disposition: snf        
83 yo M PMHx chronic a. 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 He was admitted for sepsis secondary to PNA.     #AHRF due to suspected gram negative pneumonia  #stage IV lung cancer with metastasis to brain s/p chemotherapy  #Chronic anemia  - RVP and COVID negative  - Chest X-ray: congestion and bilateral opacities, Right greater than left bibasilar opacities and right pleural effusion.  - moderate effusion and pneumonia   - pulmonary following - 4 hours of bipap on then 4 hours off with HFNC  - continue IV abx and IV steroids   - speech and swallow eval pending  - long discussion with pt's HCP (his Daughter) and his partner - interested in palliative and hospice - discussed comfort care with them as well. They have family visiting tomorrow and would like to make him CMO after the visit. Discussed with Dr. Aj of Palliative - he will see the patient. Family is very understaning of the poor prognosis. DNR/DNI. Patient was told by [facility] that no further chemo would be offered in September of 2022.     #Chronic A.fib - not on ac  #HTN - on norvasc  - c/w Metoprolol     DNR/DNI    Progress Note Handoff  Pending Consults: palliative  Pending Tests: none  Pending Results: none  Family Discussion: discussed medication, stopping labs, CMO starting tomorrow, palliative eval and overall plan of care with pt's family and medical staff. All questions answered.   Disposition: Home_____/SNF______/Other_____/Unknown at this time_____  Spent over 55 min reviewing chart and on coordinating patient care during interdisciplinary rounds       
83 yo M PMHx chronic a. 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 He was admitted for sepsis secondary to PNA.     A/P:   Acute Hypoxic Respiratory failure:   Gram negative Pneumonia:   COVID-19 infection.   Stage IV lung cancer with metastasis to brain s/p chemotherapy  Chest X-ray: congestion and bilateral opacities, Right greater than left bibasilar opacities and right pleural effusion.  On BiPAP 4 hrs on 4 hrs off.   On Antibiotics Levaquin and Zosyn.   After discussion with Family, patient was made comfort measures only.   No further BiPAP, hold IV antibiotics, IV fluid, IV nutrition, no labs.   Continue Lasix.       Chronic Atrial Fibrillation: Continue Metoprolol.   #HTN - on norvasc       DNR/DNI. CMO.     Progress Note Handoff  Pending: Comfort care, placement.   Family Discussion: discussed comfort care measures, stopping IV antibiotics,   Disposition: SNF with hospice. 
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 He was admitted for sepsis secondary to PNA.     #AHRF 2/2 sepsis 2/2 suspected gram neg pneumonia   Was found to have SpO2 in the 70's%; placed initially on BiPAP saturating 99% then to oxygen mask.  lactic acidosis    - Sepsis on admission ( RR 38 WBC 23.81 with 88.7% neutrophils)  - ABG's pH 7.34 pCO2 46 pO2 24 HCO3 25 Lactate 4.9  - RVP and COVID negative.   - Chest X-ray: congestion and bilateral opacities, Right greater than left bibasilar opacities and right pleural effusion.  - Currently on SpO2 93% on 3L NC  - s/p Levaquin in ED  - continue  Ceftriaxone and Azithromycin   - Procalcitonin, MRSA, urine Strep and legionella   - Follow up blood cultures   - Follow up repeat lactate   - c/w Duoneb   - Wean O2 as tolerated    #Troponemia likely secondary to NSTEMI type II  - Troponin 0.02-->0.01  - EKG: A.fib  - No active chest pain  - Trend Troponin  - Follow up A1c and lipid profile   - echo     #Acute on chronic HF with normal EF, effusion on cxr, possible component of Afib   - pro BNP 3665  - s/p 60 mg Lasix  - lasix 20 mg intravenous now  as recieved 20 mg po and 40 intravenous  daily   - Echo as above   - cxr in am     #lactic acidosis- 4.3 on admission -->1.3 now     #Anemia  - Hb 8.8  - Follow up iron studies  - Monitor CBC and transfuse if Hb<7  - Keep active type and screen     #chronic A.fib on Eliquis - controlled  - c/w Eliquis and Metoprolol     #HTN  - c/w amlodipine      #Stage IV lung cancer with metastasis to brain s/p chemotherapy - Follows with Margarita     #Progress Note Handoff  Pending (specify):  pneumonia and chf managment   Family discussion: dw family at bedside  Disposition: snf  Anticipated date: 72 hrs, wean o2       
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 Hospital course complicated by respiratory failure in the setting of pneumonia and metastatic lung cancer requiring BIPAP.  Palliative care consulted for GOC.    Patient now comfort measures only. Some increased dyspnea today, but responded to morphine. No barrier to discharge from a palliative care perspective.    MEDD (morphine equivalent daily dose):  6mg      See Recs below.    Please call [phone] with questions or concerns 24/7.   We will continue to follow.     Discussed with primary MD.

## 2022-12-24 NOTE — PROGRESS NOTE ADULT - PROVIDER SPECIALTY LIST ADULT
Hospitalist
Internal Medicine
Hospitalist
Hospitalist
Internal Medicine
Internal Medicine
Infectious Disease
Internal Medicine
Hospitalist
Hospitalist
Palliative Care

## 2022-12-25 NOTE — DISCHARGE NOTE FOR THE EXPIRED PATIENT - HOSPITAL COURSE
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   Patient reports that patient has been having worsening SOB associated with non-productive cough for the past week. Today, he was found to have SpO2 in the 70's% which prompted him to be brought to the ED from NH. He was given Duoneb and Decadron by EMS. Initially, patient was placed on BiPAP saturating 99% then to oxygen mask.    He denies fever, chills, rhinorrhea, chest pain. No abdominal or urinary symptoms.     In ED, vitals significant for  RR 38 SpO2 93% on 6L oxygen mask   Laboratory workup significant for WBC 23.81 with 88.7% neutrophils Hb 8.8  ABG's pH 7.34 pCO2 46 pO2 24 HCO3 25 Lactate 4.9  RVP and COVID negative.   Troponin 0.02 pro BNP 3665  Chest X-ray: congestion and bilateral opacities (unofficial read)  Admitted for further management and monitoring to Telemetry.       12/24:  Acute Hypoxic Respiratory failure:   Gram negative Pneumonia:   COVID-19 infection.   Stage IV lung cancer with metastasis to brain s/p chemotherapy  Chest X-ray: congestion and bilateral opacities, Right greater than left bibasilar opacities and right pleural effusion.  After discussion with Family, patient was made comfort measures only.   No further BiPAP, hold IV antibiotics, IV fluid, IV nutrition, no labs.   Continue Lasix.     Chronic Atrial Fibrillation: Continue Metoprolol.   HTN: on Norvasc.        DNR/DNI. CMO.

## 2023-01-04 DIAGNOSIS — J12.82 PNEUMONIA DUE TO CORONAVIRUS DISEASE 2019: ICD-10-CM

## 2023-01-04 DIAGNOSIS — J15.6 PNEUMONIA DUE TO OTHER GRAM-NEGATIVE BACTERIA: ICD-10-CM

## 2023-01-04 DIAGNOSIS — I50.33 ACUTE ON CHRONIC DIASTOLIC (CONGESTIVE) HEART FAILURE: ICD-10-CM

## 2023-01-04 DIAGNOSIS — J96.01 ACUTE RESPIRATORY FAILURE WITH HYPOXIA: ICD-10-CM

## 2023-01-04 DIAGNOSIS — Z79.01 LONG TERM (CURRENT) USE OF ANTICOAGULANTS: ICD-10-CM

## 2023-01-04 DIAGNOSIS — D64.9 ANEMIA, UNSPECIFIED: ICD-10-CM

## 2023-01-04 DIAGNOSIS — I48.20 CHRONIC ATRIAL FIBRILLATION, UNSPECIFIED: ICD-10-CM

## 2023-01-04 DIAGNOSIS — K21.9 GASTRO-ESOPHAGEAL REFLUX DISEASE WITHOUT ESOPHAGITIS: ICD-10-CM

## 2023-01-04 DIAGNOSIS — Z51.5 ENCOUNTER FOR PALLIATIVE CARE: ICD-10-CM

## 2023-01-04 DIAGNOSIS — E87.20 ACIDOSIS, UNSPECIFIED: ICD-10-CM

## 2023-01-04 DIAGNOSIS — C79.31 SECONDARY MALIGNANT NEOPLASM OF BRAIN: ICD-10-CM

## 2023-01-04 DIAGNOSIS — J44.1 CHRONIC OBSTRUCTIVE PULMONARY DISEASE WITH (ACUTE) EXACERBATION: ICD-10-CM

## 2023-01-04 DIAGNOSIS — Z92.21 PERSONAL HISTORY OF ANTINEOPLASTIC CHEMOTHERAPY: ICD-10-CM

## 2023-01-04 DIAGNOSIS — R64 CACHEXIA: ICD-10-CM

## 2023-01-04 DIAGNOSIS — U07.1 COVID-19: ICD-10-CM

## 2023-01-04 DIAGNOSIS — A41.9 SEPSIS, UNSPECIFIED ORGANISM: ICD-10-CM

## 2023-01-04 DIAGNOSIS — J44.0 CHRONIC OBSTRUCTIVE PULMONARY DISEASE WITH (ACUTE) LOWER RESPIRATORY INFECTION: ICD-10-CM

## 2023-01-04 DIAGNOSIS — I11.0 HYPERTENSIVE HEART DISEASE WITH HEART FAILURE: ICD-10-CM

## 2023-01-04 DIAGNOSIS — R77.8 OTHER SPECIFIED ABNORMALITIES OF PLASMA PROTEINS: ICD-10-CM

## 2023-01-04 DIAGNOSIS — Z66 DO NOT RESUSCITATE: ICD-10-CM

## 2023-01-04 DIAGNOSIS — C34.90 MALIGNANT NEOPLASM OF UNSPECIFIED PART OF UNSPECIFIED BRONCHUS OR LUNG: ICD-10-CM
